# Patient Record
Sex: FEMALE | Race: WHITE | NOT HISPANIC OR LATINO | Employment: FULL TIME | URBAN - METROPOLITAN AREA
[De-identification: names, ages, dates, MRNs, and addresses within clinical notes are randomized per-mention and may not be internally consistent; named-entity substitution may affect disease eponyms.]

---

## 2014-05-20 LAB
EXTERNAL HIV SCREEN: NORMAL
HCV AB SER-ACNC: NEGATIVE

## 2023-01-16 ENCOUNTER — APPOINTMENT (OUTPATIENT)
Dept: LAB | Facility: CLINIC | Age: 38
End: 2023-01-16

## 2023-01-16 ENCOUNTER — APPOINTMENT (OUTPATIENT)
Dept: URGENT CARE | Facility: CLINIC | Age: 38
End: 2023-01-16

## 2023-01-16 DIAGNOSIS — Z02.1 ENCOUNTER FOR PRE-EMPLOYMENT EXAMINATION: Primary | ICD-10-CM

## 2023-01-16 DIAGNOSIS — Z02.1 ENCOUNTER FOR PRE-EMPLOYMENT EXAMINATION: ICD-10-CM

## 2023-01-16 LAB
MEV IGG SER QL IA: NORMAL
MUV IGG SER QL IA: NORMAL
RUBV IGG SERPL IA-ACNC: 53.7 IU/ML
VZV IGG SER QL IA: NORMAL

## 2023-01-17 LAB
GAMMA INTERFERON BACKGROUND BLD IA-ACNC: 0.06 IU/ML
M TB IFN-G BLD-IMP: NEGATIVE
M TB IFN-G CD4+ BCKGRND COR BLD-ACNC: 0 IU/ML
M TB IFN-G CD4+ BCKGRND COR BLD-ACNC: 0.01 IU/ML
MITOGEN IGNF BCKGRD COR BLD-ACNC: >10 IU/ML

## 2023-05-01 ENCOUNTER — APPOINTMENT (OUTPATIENT)
Dept: LAB | Facility: CLINIC | Age: 38
End: 2023-05-01

## 2023-05-01 ENCOUNTER — OFFICE VISIT (OUTPATIENT)
Dept: ENDOCRINOLOGY | Facility: CLINIC | Age: 38
End: 2023-05-01

## 2023-05-01 VITALS
WEIGHT: 192 LBS | SYSTOLIC BLOOD PRESSURE: 122 MMHG | OXYGEN SATURATION: 98 % | BODY MASS INDEX: 30.86 KG/M2 | DIASTOLIC BLOOD PRESSURE: 74 MMHG | HEART RATE: 91 BPM | HEIGHT: 66 IN

## 2023-05-01 DIAGNOSIS — E06.3 HYPOTHYROIDISM DUE TO HASHIMOTO'S THYROIDITIS: ICD-10-CM

## 2023-05-01 DIAGNOSIS — E03.8 HYPOTHYROIDISM DUE TO HASHIMOTO'S THYROIDITIS: Primary | ICD-10-CM

## 2023-05-01 DIAGNOSIS — E06.3 HYPOTHYROIDISM DUE TO HASHIMOTO'S THYROIDITIS: Primary | ICD-10-CM

## 2023-05-01 DIAGNOSIS — E03.8 HYPOTHYROIDISM DUE TO HASHIMOTO'S THYROIDITIS: ICD-10-CM

## 2023-05-01 LAB
T4 FREE SERPL-MCNC: 1.09 NG/DL (ref 0.76–1.46)
TSH SERPL DL<=0.05 MIU/L-ACNC: 0.38 UIU/ML (ref 0.45–4.5)

## 2023-05-01 RX ORDER — LEVOTHYROXINE SODIUM 0.1 MG/1
100 TABLET ORAL
COMMUNITY
Start: 2023-04-28 | End: 2023-05-05 | Stop reason: SDUPTHER

## 2023-05-01 NOTE — PROGRESS NOTES
Moy Curiel is a 43 y o  female who presented for hypothyroidism  She was diagnosed with hypothyroidism due to Hashimoto's thyroiditis many years ago, was following with [facility] endocrinology and she presented to establish care with us  She is currently on levothyroxine 100 mcg once daily and 200 mcg on Sundays  Associated signs/symptoms:     No  weight loss/No weight gain  No  Change in appetite  No heat intolerance/No  cold intolerance  No  diarrhea/No constipation  No sweating/No  Tremor/ No palpitation  No  difficulty sleeping  No hair loss/ No dry skin/ No  brittle nails  No fatigue  Mood changes: No     Changes in menstrual cycles: Regular     Swelling in the neck: No   Signs/symptoms due to thyroid gland enlargement:  Obstructive symptoms: No trouble swallowing,hoarseness or with voice , trouble breathing     Modifying factors: Risk factors for thyroid disease:  Lithium No   Interferon  No   Amiodarone No  Iodine exposure {No      Family history of thyroid disease: No        Past medical/surgical history  Past Medical History:   Diagnosis Date    Hypothyroid     Migraine            Past Surgical History:   Procedure Laterality Date     SECTION  2018     SECTION  2020    TONSILLECTOMY  2007        Family History:  Family History   Problem Relation Age of Onset    Hypertension Mother     Hyperlipidemia Mother     Diabetes type II Maternal Grandfather     Hypertension Maternal Grandfather     Hyperlipidemia Maternal Grandfather     Hypertension Maternal Grandmother     Hyperlipidemia Maternal Grandmother     Hypertension Paternal Grandmother         Social History:  Social History     Substance and Sexual Activity   Alcohol Use Not Currently     Social History     Tobacco Use   Smoking Status Never   Smokeless Tobacco Never         Review of Systems   Constitutional: Negative for appetite change, fatigue and unexpected weight change     HENT: Negative for trouble swallowing and voice change  Eyes: Negative for visual disturbance  Respiratory: Negative for cough, shortness of breath and wheezing  Cardiovascular: Negative for palpitations and leg swelling  Gastrointestinal: Negative for abdominal pain, constipation, diarrhea, nausea and vomiting  Endocrine: Negative for cold intolerance, heat intolerance, polyphagia and polyuria  Musculoskeletal: Negative for arthralgias  Skin: Negative for color change, rash and wound  Neurological: Negative for dizziness, tremors, weakness, light-headedness, numbness and headaches  Psychiatric/Behavioral: Negative for agitation and sleep disturbance  The patient is not nervous/anxious  Physical Examination:  Vitals:    05/01/23 1303   BP: 122/74   Pulse: 91   SpO2: 98%       General appearance - alert, well appearing, and in no distress  Mental status - normal mood, behavior, speech, dress, motor activity, and thought processes  Head/Eyes:  NC/AT EOMI; no staring gaze/ lid lag  mucous membranes moist, pharynx normal without lesions  Neck - supple, no significant adenopathy  Chest - clear to auscultation, no wheezes, rales or rhonchi, symmetric air entry  Heart - normal rate, regular rhythm, normal S1, S2, no murmurs  Thyroid -normal size, not tender, no nodule noted  Labs: Thyroid Stimulating Hormone 0 36 - 3 74 uIU/mL 1 22        Thyroid Stimulating Hormone 0 36 - 3 74 uIU/mL 0 43       Imaging:    Thyroid ultrasound     Clinical History: Thyromegaly, hypothyroidism     Multiple transverse and longitudinal images of the thyroid gland performed   using a 12 MHz linear transducer  The right lobe measures approximately 4 9 x   1 7 x 1 8 cm, the left lobe measures approximately 4 5 x 1 3 x 2 1 cm  The   thyroid gland is diffusely heterogeneous in echotexture and somewhat lobular in   contour  No measurable nodules are seen  The thyroid gland bilaterally is   mildly hyperemic    Procedure Note    Hurtis Peppers, "Jose Leonardo MD - 09/15/2016   Formatting of this note might be different from the original    Thyroid ultrasound     Clinical History: Thyromegaly, hypothyroidism     Multiple transverse and longitudinal images of the thyroid gland performed   using a 12 MHz linear transducer  The right lobe measures approximately 4 9 x   1 7 x 1 8 cm, the left lobe measures approximately 4 5 x 1 3 x 2 1 cm  The   thyroid gland is diffusely heterogeneous in echotexture and somewhat lobular in   contour  No measurable nodules are seen  The thyroid gland bilaterally is   mildly hyperemic  IMPRESSION:   Impression:   1  Mildly enlarged, diffusely heterogeneous thyroid gland without measurable   nodule  The thyroid gland bilaterally is not hyperemic  Exam End: 09/14/16  3:03 PM         Previous records including pertinent laboratory and radiographic results were reviewed and are summarized in the HPI and plan below  ASSESSMENT/PLAN:              1  Hypothyroidism due to Hashimoto's thyroiditis  Currently on levothyroxine 100 mcg once daily 6 days a week and 200 mcg on Sundays  She is taking it regularly and properly, she is clinically euthyroid, no recent TFT  I ordered TSH and free T4 and will adjust levothyroxine accordingly     Discussed the pathophysiology, manifestations, differnential diagnosis and management of hypothyroidism  Reviewed the hypothalamic-ptiuitary-thyroid axis and interpretation and significance of TSH, FT4, FT3 values  Advised that levotyroxine should be taken on an empty stomach  Should avoid taking medications like iron, calcium, tums, [id number], PPI at the same time that may decrease absorption of T4  Should separate administration by 4hrs  Portions of the record may have been created with voice recognition software  Occasional wrong word or \"sound a like\" substitutions may have occurred due to the inherent limitations of voice recognition software    Read the chart carefully and " recognize, using context, where substitutions have occurred

## 2023-05-02 DIAGNOSIS — E06.3 HYPOTHYROIDISM DUE TO HASHIMOTO'S THYROIDITIS: Primary | ICD-10-CM

## 2023-05-02 DIAGNOSIS — E03.8 HYPOTHYROIDISM DUE TO HASHIMOTO'S THYROIDITIS: Primary | ICD-10-CM

## 2023-05-04 ENCOUNTER — TELEPHONE (OUTPATIENT)
Dept: ENDOCRINOLOGY | Facility: CLINIC | Age: 38
End: 2023-05-04

## 2023-05-04 DIAGNOSIS — E03.8 HYPOTHYROIDISM DUE TO HASHIMOTO'S THYROIDITIS: Primary | ICD-10-CM

## 2023-05-04 DIAGNOSIS — E06.3 HYPOTHYROIDISM DUE TO HASHIMOTO'S THYROIDITIS: Primary | ICD-10-CM

## 2023-05-04 NOTE — TELEPHONE ENCOUNTER
Pt left msg on voicemail requesting script of Levothyroxine be sent to [facility]

## 2023-05-05 RX ORDER — LEVOTHYROXINE SODIUM 0.1 MG/1
TABLET ORAL
Qty: 102 TABLET | Refills: 1 | Status: SHIPPED | OUTPATIENT
Start: 2023-05-05

## 2023-06-02 ENCOUNTER — TELEPHONE (OUTPATIENT)
Dept: ADMINISTRATIVE | Facility: OTHER | Age: 38
End: 2023-06-02

## 2023-06-02 NOTE — TELEPHONE ENCOUNTER
Upon review of the In Basket request we were able to locate, review, and update the patient chart as requested for Hepatitis C , HIV and Pap Smear (HPV) aka Cervical Cancer Screening  Any additional questions or concerns should be emailed to the Practice Liaisons via the appropriate education email address, please do not reply via In Basket      Thank you  Andrea Valenzuela

## 2023-06-02 NOTE — TELEPHONE ENCOUNTER
----- Message from Aristeo Ernandez sent at 6/1/2023 12:53 PM EDT -----  Regarding: PAP, Hep C, HIV  06/01/23 12:53 PM    Hello, our patient Vignesh Hodge has had Pap Smear (HPV) aka Cervical Cancer Screening completed/performed  Please assist in updating the patient chart by pulling the Care Everywhere (CE) document  The date of service is 03/08/2022  Thank you,  Aristeo [facility] AT [id number]    06/01/23 12:54 PM    Hello, our patient Vignesh Hodge has had Hepatitis C completed/performed  Please assist in updating the patient chart by pulling the Care Everywhere (CE) document  The date of service is 05/20/2014  Thank you,  Aristeo [facility] AT [id number]    06/01/23 12:54 PM    Hello, our patient Vignesh Hodge has had HIV completed/performed  Please assist in updating the patient chart by pulling the Care Everywhere (CE) document  The date of service is 05/20/2014       Thank you,  Aristeo [facility]

## 2023-06-26 ENCOUNTER — OFFICE VISIT (OUTPATIENT)
Dept: FAMILY MEDICINE CLINIC | Facility: CLINIC | Age: 38
End: 2023-06-26
Payer: COMMERCIAL

## 2023-06-26 VITALS
BODY MASS INDEX: 30.37 KG/M2 | WEIGHT: 189 LBS | HEIGHT: 66 IN | TEMPERATURE: 96.9 F | SYSTOLIC BLOOD PRESSURE: 124 MMHG | OXYGEN SATURATION: 99 % | HEART RATE: 95 BPM | DIASTOLIC BLOOD PRESSURE: 88 MMHG

## 2023-06-26 DIAGNOSIS — Z13.220 LIPID SCREENING: ICD-10-CM

## 2023-06-26 DIAGNOSIS — Z00.00 ANNUAL PHYSICAL EXAM: ICD-10-CM

## 2023-06-26 DIAGNOSIS — Z76.89 ENCOUNTER TO ESTABLISH CARE WITH NEW DOCTOR: Primary | ICD-10-CM

## 2023-06-26 DIAGNOSIS — E55.9 VITAMIN D DEFICIENCY: ICD-10-CM

## 2023-06-26 DIAGNOSIS — Z82.49 FAMILY HISTORY OF AORTIC ANEURYSM: ICD-10-CM

## 2023-06-26 DIAGNOSIS — Z13.1 ENCOUNTER FOR SCREENING EXAMINATION FOR IMPAIRED GLUCOSE REGULATION AND DIABETES MELLITUS: ICD-10-CM

## 2023-06-26 PROBLEM — O14.90 PREECLAMPSIA: Status: RESOLVED | Noted: 2018-06-29 | Resolved: 2023-06-26

## 2023-06-26 PROBLEM — O45.90 PLACENTAL ABRUPTION: Status: RESOLVED | Noted: 2020-12-28 | Resolved: 2023-06-26

## 2023-06-26 PROBLEM — J30.9 ALLERGIC RHINITIS: Status: ACTIVE | Noted: 2023-06-26

## 2023-06-26 PROBLEM — G43.909 MIGRAINES: Status: ACTIVE | Noted: 2023-06-26

## 2023-06-26 PROBLEM — O45.90 PLACENTAL ABRUPTION: Status: ACTIVE | Noted: 2020-12-28

## 2023-06-26 PROBLEM — O14.90 PREECLAMPSIA: Status: ACTIVE | Noted: 2018-06-29

## 2023-06-26 PROCEDURE — 99385 PREV VISIT NEW AGE 18-39: CPT | Performed by: FAMILY MEDICINE

## 2023-06-26 NOTE — PROGRESS NOTES
ADULT ANNUAL [id number]    NAME: Nelsy Lopez  AGE: 43 y o  SEX: female  : 1985     DATE: 2023     Assessment and Plan:   Dayton Marcos was seen today for establish care  Diagnoses and all orders for this visit:    Encounter to establish care with new doctor    Annual physical exam    Lipid screening  -     Lipid panel; Future    Encounter for screening examination for impaired glucose regulation and diabetes mellitus  -     HEMOGLOBIN A1C W/ EAG ESTIMATION; Future    Family history of aortic aneurysm  Comments:  pt states her mother has unruptured thoracic aort aneurysm; she herself was checked during pregnancy  and echo clear    Vitamin D deficiency  -     Vitamin D 25 hydroxy; Future        Problem List Items Addressed This Visit        Other    Vitamin D deficiency    Relevant Orders    Vitamin D 25 hydroxy    Family history of aortic aneurysm   Other Visit Diagnoses     Encounter to establish care with new doctor    -  Primary    Annual physical exam        Lipid screening        Relevant Orders    Lipid panel    Encounter for screening examination for impaired glucose regulation and diabetes mellitus        Relevant Orders    HEMOGLOBIN A1C W/ EAG ESTIMATION      generally in very good health, pt's hypothyroidism managed by endo; cpm    Immunizations and preventive care screenings were discussed with patient today  Appropriate education was printed on patient's after visit summary  Counseling:  · Exercise: the importance of regular exercise/physical activity was discussed  Recommend exercise 3-5 times per week for at least 30 minutes  BMI Counseling: Body mass index is 30 51 kg/m²  The BMI is above normal  Nutrition recommendations include reducing portion sizes  Exercise recommendations include exercising 3-5 times per week           Return in about 1 year (around 2024) for Annual physical      Chief Complaint: "    Chief Complaint   Patient presents with   • Establish Care      History of Present Illness:     Adult Annual Physical   Patient here to establish care as new pt - last PCP was [facility], pt now works for [employer] since February this year  She is also due for a comprehensive annual physical exam, which we can do today  Chart review shows that pt already switched to endo for management of hypothyr due to hashimotos'  \"and I have  GYN appt in August\"    The patient reports no problems  Her Mat GM goes in week to see if she has breast cancer- they found something abnromal on her mammo\"     Diet and Physical Activity  · Diet/Nutrition: well balanced diet  · Exercise: walking  Depression Screening  PHQ-2/9 Depression Screening    Little interest or pleasure in doing things: 0 - not at all  Feeling down, depressed, or hopeless: 0 - not at all  PHQ-2 Score: 0  PHQ-2 Interpretation: Negative depression screen       General Health  · Sleep: 6-7 hours on average  · Hearing: normal - bilateral   · Vision: previous LASIK surgery  · Dental: regular dental visits         /GYN Health  · Has appt with [facility] - last was a little over a year ago     Review of Systems:     Review of Systems   Past Medical History:     Past Medical History:   Diagnosis Date   • Hypothyroid    • Migraine    • Placental abruption 2020   • Preeclampsia 2018      Past Surgical History:     Past Surgical History:   Procedure Laterality Date   •  SECTION     •  SECTION     • TONSILLECTOMY        Social History:     Social History     Socioeconomic History   • Marital status: Single     Spouse name: None   • Number of children: None   • Years of education: None   • Highest education level: None   Occupational History   • None   Tobacco Use   • Smoking status: Never   • Smokeless tobacco: Never   Substance and Sexual Activity   • Alcohol use: Not Currently   • Drug use: Never   • Sexual activity: Not " "Currently     Partners: Male   Other Topics Concern   • None   Social History Narrative    , 2 children    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[employer] [address] Strain: Not on [employer] Insecurity: Not on file   Transportation Needs: Not on file   Physical Activity: Not on file   Stress: Not on file   Social Connections: Not on file   Intimate Partner Violence: Not on file   Housing Stability: Not on file      Family History:     Family History   Problem Relation Age of Onset   • Hypertension Mother    • Hyperlipidemia Mother    • Diabetes type II Maternal Grandfather    • Hypertension Maternal Grandfather    • Hyperlipidemia Maternal Grandfather    • Hypertension Maternal Grandmother    • Hyperlipidemia Maternal Grandmother    • Hypertension Paternal Grandmother       Current Medications:     Current Outpatient Medications   Medication Sig Dispense Refill   • Aspirin-Acetaminophen-Caffeine (EXCEDRIN MIGRAINE PO) Take by mouth     • levothyroxine 100 mcg tablet 100 mcg 6 days a week and 200 mcg on Sunday  102 tablet 1     No current facility-administered medications for this visit  Allergies:     No Known Allergies   Physical Exam:     /88 (BP Location: Left arm, Patient Position: Sitting, Cuff Size: Standard)   Pulse 95   Temp (!) 96 9 °F (36 1 °C) (Tympanic)   Ht 5' 6\" (1 676 m)   Wt 85 7 kg (189 lb)   SpO2 99%   BMI 30 51 kg/m²     Physical Exam  Vitals and nursing note reviewed  Constitutional:       General: She is not in acute distress  Appearance: She is well-developed and well-groomed  She is not ill-appearing, toxic-appearing or diaphoretic  HENT:      Head: Normocephalic and atraumatic  Right Ear: Tympanic membrane, ear canal and external ear normal       Left Ear: Tympanic membrane, ear canal and external ear normal       Nose: Nose normal       Mouth/Throat:      Lips: Pink        Mouth: Mucous membranes are moist       " Pharynx: Oropharynx is clear  Uvula midline  Tonsils: 0 on the right  0 on the left  Eyes:      General: Lids are normal       Extraocular Movements: Extraocular movements intact  Conjunctiva/sclera: Conjunctivae normal       Pupils: Pupils are equal, round, and reactive to light  Neck:      Thyroid: No thyroid mass, thyromegaly or thyroid tenderness  Vascular: No JVD  Trachea: Trachea and phonation normal    Cardiovascular:      Rate and Rhythm: Normal rate and regular rhythm  Pulses: Normal pulses  Heart sounds: Normal heart sounds  Pulmonary:      Effort: Pulmonary effort is normal       Breath sounds: Normal breath sounds and air entry  Abdominal:      General: Bowel sounds are normal  There is no distension or abdominal bruit  Palpations: Abdomen is soft  There is no hepatomegaly, splenomegaly or mass  Tenderness: There is no abdominal tenderness  Hernia: There is no hernia in the ventral area  Musculoskeletal:      Cervical back: Neck supple  Right lower leg: No edema  Left lower leg: No edema  Lymphadenopathy:      Cervical: No cervical adenopathy  Skin:     General: Skin is warm and dry  Capillary Refill: Capillary refill takes less than 2 seconds  Coloration: Skin is not pale  Neurological:      Mental Status: She is alert and oriented to person, place, and time  Cranial Nerves: Cranial nerves 2-12 are intact  Sensory: Sensation is intact  Motor: Motor function is intact  Coordination: Coordination is intact  Gait: Gait normal       Deep Tendon Reflexes: Reflexes are normal and symmetric  Psychiatric:         Mood and Affect: Mood normal          Behavior: Behavior normal  Behavior is cooperative            Carly Brown [phone]

## 2023-06-30 ENCOUNTER — APPOINTMENT (OUTPATIENT)
Dept: LAB | Facility: HOSPITAL | Age: 38
End: 2023-06-30
Attending: STUDENT IN AN ORGANIZED HEALTH CARE EDUCATION/TRAINING PROGRAM
Payer: COMMERCIAL

## 2023-06-30 DIAGNOSIS — E55.9 VITAMIN D DEFICIENCY: ICD-10-CM

## 2023-06-30 DIAGNOSIS — Z13.1 ENCOUNTER FOR SCREENING EXAMINATION FOR IMPAIRED GLUCOSE REGULATION AND DIABETES MELLITUS: ICD-10-CM

## 2023-06-30 DIAGNOSIS — E06.3 HYPOTHYROIDISM DUE TO HASHIMOTO'S THYROIDITIS: Primary | ICD-10-CM

## 2023-06-30 DIAGNOSIS — E03.9 ACQUIRED HYPOTHYROIDISM: ICD-10-CM

## 2023-06-30 DIAGNOSIS — E03.8 HYPOTHYROIDISM DUE TO HASHIMOTO'S THYROIDITIS: Primary | ICD-10-CM

## 2023-06-30 DIAGNOSIS — E06.3 HYPOTHYROIDISM DUE TO HASHIMOTO'S THYROIDITIS: ICD-10-CM

## 2023-06-30 DIAGNOSIS — Z13.220 LIPID SCREENING: ICD-10-CM

## 2023-06-30 DIAGNOSIS — E03.8 HYPOTHYROIDISM DUE TO HASHIMOTO'S THYROIDITIS: ICD-10-CM

## 2023-06-30 LAB
25(OH)D3 SERPL-MCNC: 27.7 NG/ML (ref 30–100)
CHOLEST SERPL-MCNC: 217 MG/DL
EST. AVERAGE GLUCOSE BLD GHB EST-MCNC: 111 MG/DL
HBA1C MFR BLD: 5.5 %
HDLC SERPL-MCNC: 71 MG/DL
LDLC SERPL CALC-MCNC: 128 MG/DL (ref 0–100)
NONHDLC SERPL-MCNC: 146 MG/DL
T4 FREE SERPL-MCNC: 0.64 NG/DL (ref 0.61–1.12)
TRIGL SERPL-MCNC: 89 MG/DL
TSH SERPL DL<=0.05 MIU/L-ACNC: 7.26 UIU/ML (ref 0.45–4.5)

## 2023-06-30 PROCEDURE — 82306 VITAMIN D 25 HYDROXY: CPT

## 2023-06-30 PROCEDURE — 80061 LIPID PANEL: CPT

## 2023-06-30 PROCEDURE — 83036 HEMOGLOBIN GLYCOSYLATED A1C: CPT

## 2023-06-30 PROCEDURE — 36415 COLL VENOUS BLD VENIPUNCTURE: CPT

## 2023-06-30 PROCEDURE — 84439 ASSAY OF FREE THYROXINE: CPT

## 2023-06-30 PROCEDURE — 84443 ASSAY THYROID STIM HORMONE: CPT

## 2023-06-30 RX ORDER — LEVOTHYROXINE SODIUM 112 UG/1
112 TABLET ORAL DAILY
Qty: 90 TABLET | Refills: 0 | Status: SHIPPED | OUTPATIENT
Start: 2023-06-30 | End: 2023-09-28

## 2023-07-09 ENCOUNTER — OFFICE VISIT (OUTPATIENT)
Dept: URGENT CARE | Facility: CLINIC | Age: 38
End: 2023-07-09
Payer: COMMERCIAL

## 2023-07-09 VITALS
BODY MASS INDEX: 30.6 KG/M2 | RESPIRATION RATE: 16 BRPM | WEIGHT: 190.4 LBS | TEMPERATURE: 98.4 F | HEART RATE: 94 BPM | OXYGEN SATURATION: 98 % | HEIGHT: 66 IN | DIASTOLIC BLOOD PRESSURE: 76 MMHG | SYSTOLIC BLOOD PRESSURE: 123 MMHG

## 2023-07-09 DIAGNOSIS — L03.90 CELLULITIS, UNSPECIFIED CELLULITIS SITE: Primary | ICD-10-CM

## 2023-07-09 PROCEDURE — 99213 OFFICE O/P EST LOW 20 MIN: CPT | Performed by: PHYSICIAN ASSISTANT

## 2023-07-09 RX ORDER — CEPHALEXIN 500 MG/1
500 CAPSULE ORAL EVERY 8 HOURS SCHEDULED
Qty: 21 CAPSULE | Refills: 0 | Status: SHIPPED | OUTPATIENT
Start: 2023-07-09 | End: 2023-07-16

## 2023-07-09 NOTE — PROGRESS NOTES
[facility]        NAME: Carolina Read is a 43 y.o. female  : 1985    MRN: [id number]  DATE: 2023  TIME: 2:16 PM    Assessment and Plan   Cellulitis, unspecified cellulitis site [L03.90]  1. Cellulitis, unspecified cellulitis site  cephalexin (KEFLEX) 500 mg capsule        Patient Instructions     Take antibiotic as prescribed  Warm compresses  Follow up with PCP in 3-5 days if sxs worsen or persist  Proceed to  ER if symptoms worsen. Chief Complaint     Chief Complaint   Patient presents with   • Rash     Rash to her left hip area started yesterday          History of Present Illness       Rash  This is a new problem. The current episode started yesterday. The problem has been rapidly worsening since onset. Location: left hip. The problem is moderate. The rash is characterized by redness and pain. It is unknown if there was an exposure to a precipitant. Associated symptoms include fatigue. Pertinent negatives include no fever, shortness of breath or vomiting. Past treatments include topical steroids (warm compress). Denies knowledge of tick bite    Review of Systems   Review of Systems   Constitutional: Positive for fatigue. Negative for chills, diaphoresis and fever. Respiratory: Negative for shortness of breath. Gastrointestinal: Negative for vomiting. Skin: Positive for rash. Negative for wound.          Current Medications       Current Outpatient Medications:   •  Aspirin-Acetaminophen-Caffeine (EXCEDRIN MIGRAINE PO), Take by mouth, Disp: , Rfl:   •  cephalexin (KEFLEX) 500 mg capsule, Take 1 capsule (500 mg total) by mouth every 8 (eight) hours for 7 days, Disp: 21 capsule, Rfl: 0  •  levothyroxine 112 mcg tablet, Take 1 tablet (112 mcg total) by mouth daily, Disp: 90 tablet, Rfl: 0    Current Allergies     Allergies as of 2023   • (No Known Allergies)            The following portions of the patient's history were reviewed and updated as appropriate: allergies, current medications, past family history, past medical history, past social history, past surgical history and problem list.     Past Medical History:   Diagnosis Date   • Hypothyroid    • Migraine    • Placental abruption 2020   • Preeclampsia 2018       Past Surgical History:   Procedure Laterality Date   •  SECTION     •  SECTION     • TONSILLECTOMY         Family History   Problem Relation Age of Onset   • Hypertension Mother    • Hyperlipidemia Mother    • Diabetes type II Maternal Grandfather    • Hypertension Maternal Grandfather    • Hyperlipidemia Maternal Grandfather    • Hypertension Maternal Grandmother    • Hyperlipidemia Maternal Grandmother    • Hypertension Paternal Grandmother          Medications have been verified. Objective   /76   Pulse 94   Temp 98.4 °F (36.9 °C)   Resp 16   Ht 5' 6" (1.676 m)   Wt 86.4 kg (190 lb 6.4 oz)   SpO2 98%   BMI 30.73 kg/m²   No LMP recorded. Physical Exam     Physical Exam  Constitutional:       Appearance: Normal appearance. Cardiovascular:      Rate and Rhythm: Normal rate and regular rhythm. Pulmonary:      Effort: Pulmonary effort is normal. No respiratory distress. Abdominal:      General: Abdomen is flat. There is no distension. Palpations: Abdomen is soft. Skin:         Neurological:      Mental Status: She is alert.

## 2023-08-11 ENCOUNTER — OFFICE VISIT (OUTPATIENT)
Dept: OBGYN CLINIC | Facility: MEDICAL CENTER | Age: 38
End: 2023-08-11
Payer: COMMERCIAL

## 2023-08-11 VITALS
HEIGHT: 66 IN | WEIGHT: 184 LBS | BODY MASS INDEX: 29.57 KG/M2 | SYSTOLIC BLOOD PRESSURE: 110 MMHG | DIASTOLIC BLOOD PRESSURE: 80 MMHG

## 2023-08-11 DIAGNOSIS — Z01.419 ENCOUNTER FOR WELL WOMAN EXAM WITH ROUTINE GYNECOLOGICAL EXAM: Primary | ICD-10-CM

## 2023-08-11 PROCEDURE — S0610 ANNUAL GYNECOLOGICAL EXAMINA: HCPCS | Performed by: OBSTETRICS & GYNECOLOGY

## 2023-08-11 NOTE — PROGRESS NOTES
[facility] [address] [place] [address]    ASSESSMENT/PLAN: Karey Dumont is a 43 y.o. [id number] who presents for annual gynecologic exam.    Encounter for routine gynecologic examination  - Routine well woman exam completed today. - Cervical Cancer Screening: Current ASCCP Guidelines reviewed. Last Pap: 03/08/2022. Discussed every 2-3 years  - HPV Vaccination status: Immunization series complete  - Contraceptive counseling discussed. Current contraception: none     Additional problems addressed during this visit:  1. Encounter for well woman exam with routine gynecological exam        CC:  Annual Gynecologic Examination    HPI: Karey Dumont is a 43 y.o. [id number] who presents for annual gynecologic examination. HPI  She reports no new changes to her health. She reports no breast concerns. She gets regular periods. She has no vaginal discharge, vulvar or vaginal lesions, pelvic pain, or abnormal bleeding. She has no sexual health concerns and is currently sexually active with one male partner. She contracepts with nothing. Discussed prior obstetric history of pre-eclampsia and abruption. Discussed testing for APS  Also discussed preconceptual counseling with M as well    The following portions of the patient's history were reviewed and updated as appropriate: allergies, current medications, past family history, past medical history, obstetric history, gynecologic history, past social history, past surgical history and problem list.    Review of Systems   Constitutional: Negative. HENT: Negative. Eyes: Negative. Respiratory: Negative. Cardiovascular: Negative. Gastrointestinal: Negative. Genitourinary: Negative. Musculoskeletal: Negative. All other systems reviewed and are negative.         Objective:  /80 (BP Location: Left arm)   Ht 5' 6" (1.676 m)   Wt 83.5 kg (184 lb)   LMP 08/08/2023   BMI 29.70 kg/m²    Physical Exam  Vitals reviewed. Constitutional:       General: She is not in acute distress. Appearance: She is well-developed. HENT:      Head: Normocephalic and atraumatic. Nose: Nose normal.   Cardiovascular:      Rate and Rhythm: Normal rate. Pulmonary:      Effort: Pulmonary effort is normal. No respiratory distress. Chest:   Breasts:     Breasts are symmetrical.      Right: Normal. No mass, nipple discharge, skin change or tenderness. Left: Normal. No mass, nipple discharge, skin change or tenderness. Abdominal:      General: There is no distension. Palpations: Abdomen is soft. There is no mass. Tenderness: There is no abdominal tenderness. There is no guarding or rebound. Genitourinary:     General: Normal vulva. Exam position: Lithotomy position. Labia:         Right: No lesion. Left: No lesion. Urethra: No prolapse (urethral meatus normal). Vagina: Normal. No vaginal discharge, erythema or bleeding. Cervix: Normal.      Uterus: Normal.       Adnexa: Right adnexa normal and left adnexa normal.   Musculoskeletal:         General: Normal range of motion. Cervical back: Normal range of motion. Lymphadenopathy:      Upper Body:      Right upper body: No supraclavicular, axillary or pectoral adenopathy. Left upper body: No supraclavicular, axillary or pectoral adenopathy. Lower Body: No right inguinal adenopathy. No left inguinal adenopathy. Skin:     General: Skin is warm and dry. Neurological:      Mental Status: She is alert and oriented to person, place, and time. Psychiatric:         Behavior: Behavior normal.         Thought Content:  Thought content normal.         Judgment: Judgment normal.

## 2023-09-07 ENCOUNTER — APPOINTMENT (OUTPATIENT)
Dept: LAB | Facility: HOSPITAL | Age: 38
End: 2023-09-07
Attending: STUDENT IN AN ORGANIZED HEALTH CARE EDUCATION/TRAINING PROGRAM
Payer: COMMERCIAL

## 2023-09-07 DIAGNOSIS — E06.3 HYPOTHYROIDISM DUE TO HASHIMOTO'S THYROIDITIS: ICD-10-CM

## 2023-09-07 DIAGNOSIS — E03.8 HYPOTHYROIDISM DUE TO HASHIMOTO'S THYROIDITIS: ICD-10-CM

## 2023-09-07 LAB
T4 FREE SERPL-MCNC: 0.93 NG/DL (ref 0.61–1.12)
TSH SERPL DL<=0.05 MIU/L-ACNC: 1.08 UIU/ML (ref 0.45–4.5)

## 2023-09-07 PROCEDURE — 36415 COLL VENOUS BLD VENIPUNCTURE: CPT

## 2023-09-07 PROCEDURE — 84439 ASSAY OF FREE THYROXINE: CPT

## 2023-09-07 PROCEDURE — 84443 ASSAY THYROID STIM HORMONE: CPT

## 2023-09-08 DIAGNOSIS — E06.3 HYPOTHYROIDISM DUE TO HASHIMOTO'S THYROIDITIS: ICD-10-CM

## 2023-09-08 DIAGNOSIS — E03.8 HYPOTHYROIDISM DUE TO HASHIMOTO'S THYROIDITIS: ICD-10-CM

## 2023-09-08 RX ORDER — LEVOTHYROXINE SODIUM 112 UG/1
112 TABLET ORAL DAILY
Qty: 90 TABLET | Refills: 0 | Status: SHIPPED | OUTPATIENT
Start: 2023-09-08 | End: 2023-12-07

## 2023-10-10 DIAGNOSIS — E03.8 HYPOTHYROIDISM DUE TO HASHIMOTO'S THYROIDITIS: ICD-10-CM

## 2023-10-10 DIAGNOSIS — E06.3 HYPOTHYROIDISM DUE TO HASHIMOTO'S THYROIDITIS: ICD-10-CM

## 2023-10-10 RX ORDER — LEVOTHYROXINE SODIUM 112 UG/1
112 TABLET ORAL DAILY
Qty: 90 TABLET | Refills: 0 | Status: SHIPPED | OUTPATIENT
Start: 2023-10-10 | End: 2024-01-08

## 2023-11-16 ENCOUNTER — OFFICE VISIT (OUTPATIENT)
Dept: ENDOCRINOLOGY | Facility: CLINIC | Age: 38
End: 2023-11-16
Payer: COMMERCIAL

## 2023-11-16 VITALS
TEMPERATURE: 97.5 F | DIASTOLIC BLOOD PRESSURE: 68 MMHG | BODY MASS INDEX: 30.79 KG/M2 | WEIGHT: 191.6 LBS | SYSTOLIC BLOOD PRESSURE: 112 MMHG | HEIGHT: 66 IN | OXYGEN SATURATION: 98 % | HEART RATE: 92 BPM

## 2023-11-16 DIAGNOSIS — E03.8 HYPOTHYROIDISM DUE TO HASHIMOTO'S THYROIDITIS: Primary | ICD-10-CM

## 2023-11-16 DIAGNOSIS — E06.3 HYPOTHYROIDISM DUE TO HASHIMOTO'S THYROIDITIS: Primary | ICD-10-CM

## 2023-11-16 PROCEDURE — 99213 OFFICE O/P EST LOW 20 MIN: CPT | Performed by: STUDENT IN AN ORGANIZED HEALTH CARE EDUCATION/TRAINING PROGRAM

## 2023-11-16 NOTE — PROGRESS NOTES
Leela Montenegro 43 y.o. female MRN: [id number]    Encounter: [id number]      Assessment/Plan     1. Hypothyroidism due to Hashimoto's thyroiditis  Eli Bustos is clinically and biochemically euthyroid, she is on levothyroxine 112 mcg once a day, she is taking it regularly and properly. We will continue current regimen. Check TSH and free T4 now. Return back in 6 months. - T4, free Lab Collect; Future  - TSH, 3rd generation Lab Collect; Future    CC:   Hypothyroidism     History of Present Illness     HPI:  Leela Montenegro is a 43year old female with history of hypothyroidism due to Hashimoto's thyroiditis who presents for follow-up. She was last seen in May 2020, currently on levothyroxine 112 mcg daily. Component      Latest Ref Rng 9/7/2023   Free T4      0.61 - 1.12 ng/dL 0.93    TSH 3RD GENERATON      0.450 - 4.500 uIU/mL 1.076      She reports hair loss,     Eli Bustos denies palpitations, tremors, heat intolerance, bowel movement changes. Review of Systems   Constitutional:  Negative for appetite change, fatigue and unexpected weight change. HENT:  Negative for trouble swallowing and voice change. Eyes:  Negative for visual disturbance. Respiratory:  Negative for cough, shortness of breath and wheezing. Cardiovascular:  Negative for palpitations and leg swelling. Gastrointestinal:  Negative for abdominal pain, constipation, diarrhea, nausea and vomiting. Endocrine: Negative for cold intolerance, heat intolerance, polyphagia and polyuria. Musculoskeletal:  Negative for arthralgias. Skin:  Negative for color change, rash and wound. Neurological:  Negative for dizziness, tremors, weakness, light-headedness, numbness and headaches. Psychiatric/Behavioral:  Negative for agitation and sleep disturbance. The patient is not nervous/anxious.         Historical Information   Past Medical History:   Diagnosis Date    Abnormal Pap smear of cervix     Hypothyroid     Migraine     Placental abruption 2020    Preeclampsia 2018     Past Surgical History:   Procedure Laterality Date     SECTION       SECTION      TONSILLECTOMY  2007     Social History   Social History     Substance and Sexual Activity   Alcohol Use Not Currently     Social History     Substance and Sexual Activity   Drug Use Never     Social History     Tobacco Use   Smoking Status Never   Smokeless Tobacco Never     Family History:   Family History   Problem Relation Age of Onset    Hypertension Mother     Hyperlipidemia Mother     Asthma Mother     Migraines Mother     Thyroid disease Mother     Diabetes type II Maternal Grandfather     Hypertension Maternal Grandfather     Hyperlipidemia Maternal Grandfather     Diabetes Maternal Grandfather     Heart attack Maternal Grandfather     Heart disease Maternal Grandfather     Heart failure Maternal Grandfather     Hypertension Maternal Grandmother     Hyperlipidemia Maternal Grandmother     Heart disease Maternal Grandmother     Migraines Maternal Grandmother     Hypertension Paternal Grandmother        Meds/Allergies   Current Outpatient Medications   Medication Sig Dispense Refill    Aspirin-Acetaminophen-Caffeine (EXCEDRIN MIGRAINE PO) Take by mouth      levothyroxine 112 mcg tablet Take 1 tablet (112 mcg total) by mouth daily 90 tablet 0     No current facility-administered medications for this visit. No Known Allergies    Objective   Vitals: There were no vitals taken for this visit. Physical Exam  Constitutional:       Appearance: She is well-developed. HENT:      Head: Normocephalic and atraumatic. Nose: Nose normal.   Eyes:      Pupils: Pupils are equal, round, and reactive to light. Neck:      Thyroid: No thyromegaly. Vascular: No JVD. Cardiovascular:      Rate and Rhythm: Normal rate and regular rhythm. Heart sounds: Normal heart sounds. No murmur heard. No friction rub. No gallop.    Pulmonary:      Effort: Pulmonary effort is normal. No respiratory distress. Breath sounds: Normal breath sounds. No stridor. No wheezing or rales. Chest:      Chest wall: No tenderness. Abdominal:      General: Bowel sounds are normal. There is no distension. Palpations: Abdomen is soft. There is no mass. Tenderness: There is no abdominal tenderness. There is no guarding. Musculoskeletal:         General: No deformity. Normal range of motion. Cervical back: Normal range of motion. Skin:     General: Skin is warm. Neurological:      Mental Status: She is alert and oriented to person, place, and time. The history was obtained from the review of the chart, patient. Lab Results:   Lab Results   Component Value Date/Time    TSH 3RD GENERATON 1.076 09/07/2023 08:22 AM    TSH 3RD GENERATON 7.256 (H) 06/30/2023 06:48 AM    TSH 3RD GENERATON 0.377 (L) 05/01/2023 01:33 PM    Free T4 0.93 09/07/2023 08:22 AM    Free T4 0.64 06/30/2023 06:48 AM    Free T4 1.09 05/01/2023 01:33 PM       Imaging Studies:       I have personally reviewed pertinent reports. Portions of the record may have been created with voice recognition software. Occasional wrong word or "sound a like" substitutions may have occurred due to the inherent limitations of voice recognition software. Read the chart carefully and recognize, using context, where substitutions have occurred.

## 2023-11-21 ENCOUNTER — AMB VIDEO VISIT (OUTPATIENT)
Dept: OTHER | Facility: HOSPITAL | Age: 38
End: 2023-11-21
Payer: COMMERCIAL

## 2023-11-21 VITALS — HEART RATE: 93 BPM | RESPIRATION RATE: 16 BRPM | TEMPERATURE: 98.7 F

## 2023-11-21 DIAGNOSIS — J20.9 ACUTE BRONCHITIS, UNSPECIFIED ORGANISM: Primary | ICD-10-CM

## 2023-11-21 PROBLEM — E03.4 HYPOTHYROIDISM DUE TO ACQUIRED ATROPHY OF THYROID: Chronic | Status: ACTIVE | Noted: 2021-08-19

## 2023-11-21 PROCEDURE — 99212 OFFICE O/P EST SF 10 MIN: CPT | Performed by: PHYSICIAN ASSISTANT

## 2023-11-21 RX ORDER — ALBUTEROL SULFATE 90 UG/1
2 AEROSOL, METERED RESPIRATORY (INHALATION) EVERY 6 HOURS PRN
Qty: 6.7 G | Refills: 0 | Status: SHIPPED | OUTPATIENT
Start: 2023-11-21

## 2023-11-21 RX ORDER — METHYLPREDNISOLONE 4 MG/1
TABLET ORAL
Qty: 21 TABLET | Refills: 0 | Status: SHIPPED | OUTPATIENT
Start: 2023-11-21

## 2023-11-21 NOTE — PATIENT INSTRUCTIONS
Schedule a follow-up appointment with your primary care physician for recheck in 2-3 days. If you cannot see your PCP, you can schedule a follow up appointment at a [facility]. Go to the emergency department if you develop any new or worsening symptoms including shortness of breath, chest pain, or anything else that is concerning. Excuses can be found in "Letters" section of [employer]. Can print if opened from a [address] phone number is [phone] if you need assistance or have further questions    1 21 ) [place] ([phone])  Schedule or Reschedule Outpatient Testing - Option 2  Billing - Option 3  General Info - Option 4  [employer] Help - Option 5  Comprehensive Spine Program - Option 6   COVID - Option 7    Acute Bronchitis   WHAT YOU NEED TO KNOW:   Acute bronchitis is swelling and irritation in your lungs. It is usually caused by a virus and most often happens in the winter. Bronchitis may also be caused by bacteria or by a chemical irritant, such as smoke. DISCHARGE INSTRUCTIONS:   Return to the emergency department if:   You cough up blood. Your lips or fingernails turn blue. You feel like you are not getting enough air when you breathe. Call your doctor if:   Your symptoms do not go away or get worse, even after treatment. Your cough does not get better within 4 weeks. You have questions or concerns about your condition or care. Medicines: You may need any of the following:  Cough suppressants  decrease your urge to cough. Decongestants  help loosen mucus in your lungs and make it easier to cough up. This can help you breathe easier. Inhalers  may be given. Your healthcare provider may give you one or more inhalers to help you breathe easier and cough less. An inhaler gives you medicine to open your airways. Ask your healthcare provider to show you how to use your inhaler correctly.          Antiviral medicine  treats infections caused by a virus.    Antibiotics  may be given if your bronchitis is caused by bacteria or if you have lung condition. Acetaminophen  decreases pain and fever. It is available without a doctor's order. Ask how much to take and how often to take it. Follow directions. Read the labels of all other medicines you are using to see if they also contain acetaminophen, or ask your doctor or pharmacist. Acetaminophen can cause liver damage if not taken correctly. NSAIDs  help decrease swelling and pain or fever. This medicine is available with or without a doctor's order. NSAIDs can cause stomach bleeding or kidney problems in certain people. If you take blood thinner medicine, always ask your healthcare provider if NSAIDs are safe for you. Always read the medicine label and follow directions. Self-care:   Drink liquids as directed. You may need to drink more liquids than usual to stay hydrated. Ask how much liquid to drink each day and which liquids are best for you. Use a cool mist humidifier. This increases air moisture in your home. This may make it easier for you to breathe and help decrease your cough. Get more rest.  Rest helps your body to heal. Slowly start to do more each day. Rest when you feel it is needed. Prevent acute bronchitis:       Ask about vaccines you may need. Get a flu vaccine each year as soon as recommended, usually in September or October. Ask your healthcare provider if you should also get a pneumonia or COVID-19 vaccine. Your healthcare provider can tell you if you should also get other vaccines, and when to get them. Prevent the spread of germs. You can decrease your risk for acute bronchitis and other illnesses by doing the following:     Wash your hands often with soap and water. Carry germ-killing hand lotion or gel with you. You can use the lotion or gel to clean your hands when soap and water are not available.          Do not touch your eyes, nose, or mouth unless you have washed your hands first.    Always cover your mouth when you cough to prevent the spread of germs. It is best to cough into a tissue or your shirt sleeve instead of into your hand. Ask those around you to cover their mouths when they cough. Try to avoid people who have a cold or the flu. If you are sick, stay away from others as much as possible. Avoid irritants in the air. Avoid chemicals, fumes, and dust. Wear a face mask if you must work around dust or fumes. Stay inside on days when air pollution levels are high. If you have allergies, stay inside when pollen counts are high. Do not use aerosol products, such as spray-on deodorant, bug spray, and hair spray. Do not smoke or be around others who are smoking. Nicotine and other chemicals in cigarettes and cigars can cause lung damage. Ask your healthcare provider for information if you currently smoke and need help to quit. E-cigarettes or smokeless tobacco still contain nicotine. Talk to your healthcare provider before you use these products. Follow up with your doctor as directed:  Write down questions you have so you will remember to ask them during your follow-up visits. © Copyright Elisha Goltz 2023 Information is for End User's use only and may not be sold, redistributed or otherwise used for commercial purposes. The above information is an  only. It is not intended as medical advice for individual conditions or treatments. Talk to your doctor, nurse or pharmacist before following any medical regimen to see if it is safe and effective for you.

## 2023-11-21 NOTE — PROGRESS NOTES
Required Documentation:  Encounter provider Indu Ortiz PA-C    Provider located at [facility] [id number]    Identify all parties in room with patient during virtual visit:  No one else    The patient was identified by name and date of birth. Shelli Nunn was informed that this is a telemedicine visit and that the visit is being conducted through the [address] [place] [address] Dr platform. She agrees to proceed. .  My office door was closed. No one else was in the room. She acknowledged consent and understanding of privacy and security of the video platform. The patient has agreed to participate and understands they can discontinue the visit at any time. Verification of patient location:    Patient is located at home in the following state in which I hold an active license [place]    Patient is aware this is a billable service. Reason for visit is No chief complaint on file. Subjective  HPI   Pt reports uri sx x 2 weeks. Reports chest tightness, SOLIS and cough which is scantly productive of clear sputum. Endorses congestion and rhinorrhea. Tried mucinex and mucinex fast max without relief. Denies fevers, NVD, CP, leg swelling, recent travel, chance of pregnancy. Kids recently sick with similar sx. Past Medical History:   Diagnosis Date    Abnormal Pap smear of cervix     Hypothyroid     Migraine     Placental abruption 2020    Preeclampsia 2018       Past Surgical History:   Procedure Laterality Date     SECTION       SECTION      TONSILLECTOMY  2007        No Known Allergies    Review of Systems   Constitutional:  Negative for fever. HENT:  Negative for nosebleeds. Eyes:  Negative for redness. Respiratory:  Positive for cough, chest tightness and shortness of breath. Cardiovascular:  Negative for chest pain. Gastrointestinal:  Negative for blood in stool. Genitourinary:  Negative for hematuria. Musculoskeletal:  Negative for gait problem. Skin:  Negative for rash. Neurological:  Negative for seizures. Psychiatric/Behavioral:  Negative for behavioral problems. Video Exam    Vitals:    11/21/23 1312   Pulse: 93   Resp: 16   Temp: 98.7 °F (37.1 °C)       Physical Exam  Constitutional:       General: She is not in acute distress. Appearance: Normal appearance. She is not toxic-appearing. HENT:      Head: Normocephalic and atraumatic. Nose: No rhinorrhea. Mouth/Throat:      Mouth: Mucous membranes are moist.   Eyes:      Conjunctiva/sclera: Conjunctivae normal.   Pulmonary:      Effort: Pulmonary effort is normal. No respiratory distress. Breath sounds: No wheezing (no gross audible wheeze through computer). Comments: Cough is dry  Musculoskeletal:      Cervical back: Normal range of motion. Skin:     Findings: No rash (on face or neck). Neurological:      Mental Status: She is alert. Cranial Nerves: No dysarthria or facial asymmetry. Psychiatric:         Mood and Affect: Mood normal.         Behavior: Behavior normal.         Visit Time  Total Visit Duration: 9 minutes    Assessment/Plan:    Diagnoses and all orders for this visit:    Acute bronchitis, unspecified organism  -     methylPREDNISolone 4 MG tablet therapy pack; Use as directed on package  -     albuterol (Proventil HFA) 90 mcg/act inhaler; Inhale 2 puffs every 6 (six) hours as needed for wheezing        Patient Instructions   Schedule a follow-up appointment with your primary care physician for recheck in 2-3 days. If you cannot see your PCP, you can schedule a follow up appointment at a [facility]. Go to the emergency department if you develop any new or worsening symptoms including shortness of breath, chest pain, or anything else that is concerning. Excuses can be found in "Letters" section of [employer].  Can print if opened from a web [address] phone number is [phone] if you need assistance or have further questions    1 21 ) [place] ([phone])  Schedule or Reschedule Outpatient Testing - Option 2  Billing - Option 3  General Info - Option 4  MyChart Help - Option 5  Comprehensive Spine Program - Option 6   COVID - Option 7    Acute Bronchitis   WHAT YOU NEED TO KNOW:   Acute bronchitis is swelling and irritation in your lungs. It is usually caused by a virus and most often happens in the winter. Bronchitis may also be caused by bacteria or by a chemical irritant, such as smoke. DISCHARGE INSTRUCTIONS:   Return to the emergency department if:   You cough up blood. Your lips or fingernails turn blue. You feel like you are not getting enough air when you breathe. Call your doctor if:   Your symptoms do not go away or get worse, even after treatment. Your cough does not get better within 4 weeks. You have questions or concerns about your condition or care. Medicines: You may need any of the following:  Cough suppressants  decrease your urge to cough. Decongestants  help loosen mucus in your lungs and make it easier to cough up. This can help you breathe easier. Inhalers  may be given. Your healthcare provider may give you one or more inhalers to help you breathe easier and cough less. An inhaler gives you medicine to open your airways. Ask your healthcare provider to show you how to use your inhaler correctly. Antiviral medicine  treats infections caused by a virus. Antibiotics  may be given if your bronchitis is caused by bacteria or if you have lung condition. Acetaminophen  decreases pain and fever. It is available without a doctor's order. Ask how much to take and how often to take it. Follow directions.  Read the labels of all other medicines you are using to see if they also contain acetaminophen, or ask your doctor or pharmacist. Acetaminophen can cause liver damage if not taken correctly. NSAIDs  help decrease swelling and pain or fever. This medicine is available with or without a doctor's order. NSAIDs can cause stomach bleeding or kidney problems in certain people. If you take blood thinner medicine, always ask your healthcare provider if NSAIDs are safe for you. Always read the medicine label and follow directions. Self-care:   Drink liquids as directed. You may need to drink more liquids than usual to stay hydrated. Ask how much liquid to drink each day and which liquids are best for you. Use a cool mist humidifier. This increases air moisture in your home. This may make it easier for you to breathe and help decrease your cough. Get more rest.  Rest helps your body to heal. Slowly start to do more each day. Rest when you feel it is needed. Prevent acute bronchitis:       Ask about vaccines you may need. Get a flu vaccine each year as soon as recommended, usually in September or October. Ask your healthcare provider if you should also get a pneumonia or COVID-19 vaccine. Your healthcare provider can tell you if you should also get other vaccines, and when to get them. Prevent the spread of germs. You can decrease your risk for acute bronchitis and other illnesses by doing the following:     Wash your hands often with soap and water. Carry germ-killing hand lotion or gel with you. You can use the lotion or gel to clean your hands when soap and water are not available. Do not touch your eyes, nose, or mouth unless you have washed your hands first.    Always cover your mouth when you cough to prevent the spread of germs. It is best to cough into a tissue or your shirt sleeve instead of into your hand. Ask those around you to cover their mouths when they cough. Try to avoid people who have a cold or the flu. If you are sick, stay away from others as much as possible. Avoid irritants in the air.   Avoid chemicals, fumes, and dust. Wear a face mask if you must work around dust or fumes. Stay inside on days when air pollution levels are high. If you have allergies, stay inside when pollen counts are high. Do not use aerosol products, such as spray-on deodorant, bug spray, and hair spray. Do not smoke or be around others who are smoking. Nicotine and other chemicals in cigarettes and cigars can cause lung damage. Ask your healthcare provider for information if you currently smoke and need help to quit. E-cigarettes or smokeless tobacco still contain nicotine. Talk to your healthcare provider before you use these products. Follow up with your doctor as directed:  Write down questions you have so you will remember to ask them during your follow-up visits. © Copyright Brina Valenzuela 2023 Information is for End User's use only and may not be sold, redistributed or otherwise used for commercial purposes. The above information is an  only. It is not intended as medical advice for individual conditions or treatments. Talk to your doctor, nurse or pharmacist before following any medical regimen to see if it is safe and effective for you.

## 2023-11-21 NOTE — CARE ANYWHERE EVISITS
Visit Summary for Wendy Hargrove - Gender: Female - Date of Birth: [id number]  Date: [id number] - Duration: 8 minutes  Patient: Wendy Bazanbalaji  Provider: Michelle Galaviz PA-C    Patient Contact Information  Address  [address] [place] [address]; [address] [facility]  [phone]    Visit Topics  Cold [Added By: Self - 2023-11-21]    Triage Questions   What is your current physical address in the event of a medical emergency? Answer []  Are you allergic to any medications? Answer []  Are you now or could you be pregnant? Answer []  Do you have any immune system compromise or chronic lung   disease? Answer []  Do you have any vulnerable family members in the home (infant, pregnant, cancer, elderly)? Answer []     Conversation Transcripts  [0A][0A] [Notification] You are connected with Michelle aGlaviz PA-C, Urgent Care Specialist.[0A][Notification] ABDULKADIR Escalera is located in [place]. [0A][Notification] Wendy Hargrove has shared health history. [facility] .[0A]    Diagnosis  Acute bronchitis    Procedures  Value: [id number] Code: CPT-4 UNLISTED E&M SERVICE    Medications Prescribed    No prescriptions ordered    Electronically signed by: Salma Kahn(NPI [id number])

## 2023-11-24 ENCOUNTER — APPOINTMENT (OUTPATIENT)
Dept: LAB | Facility: HOSPITAL | Age: 38
End: 2023-11-24
Payer: COMMERCIAL

## 2023-11-24 DIAGNOSIS — E03.8 HYPOTHYROIDISM DUE TO HASHIMOTO'S THYROIDITIS: ICD-10-CM

## 2023-11-24 DIAGNOSIS — E06.3 HYPOTHYROIDISM DUE TO HASHIMOTO'S THYROIDITIS: ICD-10-CM

## 2023-11-24 LAB
T4 FREE SERPL-MCNC: 0.72 NG/DL (ref 0.61–1.12)
TSH SERPL DL<=0.05 MIU/L-ACNC: 0.87 UIU/ML (ref 0.45–4.5)

## 2023-11-24 PROCEDURE — 84443 ASSAY THYROID STIM HORMONE: CPT

## 2023-11-24 PROCEDURE — 84439 ASSAY OF FREE THYROXINE: CPT

## 2023-11-24 PROCEDURE — 36415 COLL VENOUS BLD VENIPUNCTURE: CPT

## 2023-11-27 DIAGNOSIS — E03.4 HYPOTHYROIDISM DUE TO ACQUIRED ATROPHY OF THYROID: Primary | Chronic | ICD-10-CM

## 2023-12-20 ENCOUNTER — OFFICE VISIT (OUTPATIENT)
Dept: URGENT CARE | Facility: CLINIC | Age: 38
End: 2023-12-20
Payer: COMMERCIAL

## 2023-12-20 VITALS
RESPIRATION RATE: 18 BRPM | BODY MASS INDEX: 31.18 KG/M2 | TEMPERATURE: 98.2 F | DIASTOLIC BLOOD PRESSURE: 84 MMHG | HEART RATE: 98 BPM | OXYGEN SATURATION: 99 % | SYSTOLIC BLOOD PRESSURE: 136 MMHG | WEIGHT: 194 LBS | HEIGHT: 66 IN

## 2023-12-20 DIAGNOSIS — B96.89 ACUTE BACTERIAL BRONCHITIS: Primary | ICD-10-CM

## 2023-12-20 DIAGNOSIS — J20.8 ACUTE BACTERIAL BRONCHITIS: Primary | ICD-10-CM

## 2023-12-20 PROCEDURE — 99213 OFFICE O/P EST LOW 20 MIN: CPT | Performed by: PHYSICIAN ASSISTANT

## 2023-12-20 RX ORDER — AZITHROMYCIN 250 MG/1
TABLET, FILM COATED ORAL
Qty: 6 TABLET | Refills: 0 | Status: SHIPPED | OUTPATIENT
Start: 2023-12-20 | End: 2023-12-24

## 2023-12-20 NOTE — PROGRESS NOTES
[facility]      NAME: Gwendolyn Jorge is a 38 y.o. female  : 1985    MRN: [id number]  DATE: 2023  TIME: 5:37 PM    Assessment and Plan   Acute bacterial bronchitis [J20.8, B96.89]  1. Acute bacterial bronchitis  azithromycin (ZITHROMAX) 250 mg tablet          Patient Instructions   I have prescribed an antibiotic for the infection.  Please take the antibiotic as prescribed and finish the entire prescription.  I recommend that the patient takes an over the counter probiotic or eats yogurt with live cultures in it (activia) to keep good bacteria in the gut and help prevent diarrhea.  Wash hands frequently to prevent the spread of infection.  Can use over the counter cough and cold medications to help with symptoms.  Ibuprofen and/or tylenol as needed for pain or fever.  If not improving over the next 3-5 days, follow up with PCP.    To present to the ER if symptoms worsen.  Chief Complaint     Chief Complaint   Patient presents with    Sinusitis     Started a week ago. Started in her head, sinus drainage, sore throat. Afraid it will go into her chest.            History of Present Illness   Gwendolyn Jorge presents to the clinic c/o    Sinusitis  This is a new problem. The current episode started 1 to 4 weeks ago. The problem has been gradually worsening since onset. There has been no fever. The pain is moderate. Associated symptoms include congestion, coughing, shortness of breath, sinus pressure and a sore throat. Pertinent negatives include no chills, diaphoresis, ear pain or headaches. Treatments tried: inhaler, mucinex. The treatment provided no relief.   - at home covid test    Review of Systems   Review of Systems   Constitutional:  Negative for chills, diaphoresis, fatigue and fever.   HENT:  Positive for congestion, postnasal drip, sinus pressure, sinus pain and sore throat. Negative for ear discharge, ear pain and facial swelling.    Eyes:  Negative for photophobia, pain,  discharge, redness, itching and visual disturbance.   Respiratory:  Positive for cough, shortness of breath and wheezing. Negative for apnea and chest tightness.    Cardiovascular:  Negative for chest pain and palpitations.   Gastrointestinal:  Negative for abdominal pain.   Skin:  Negative for color change, rash and wound.   Neurological:  Negative for dizziness and headaches.   Hematological:  Negative for adenopathy.         Current Medications     Long-Term Medications   Medication Sig Dispense Refill    levothyroxine 112 mcg tablet Take 1 tablet (112 mcg total) by mouth daily 90 tablet 0       Current Allergies     Allergies as of 2023    (No Known Allergies)            The following portions of the patient's history were reviewed and updated as appropriate: allergies, current medications, past family history, past medical history, past social history, past surgical history and problem list.  Past Medical History:   Diagnosis Date    Abnormal Pap smear of cervix     Hypothyroid     Migraine     Placental abruption 2020    Preeclampsia 2018     Past Surgical History:   Procedure Laterality Date     SECTION       SECTION      TONSILLECTOMY  2007     Social History     Socioeconomic History    Marital status: Single     Spouse name: Not on file    Number of children: Not on file    Years of education: Not on file    Highest education level: Not on file   Occupational History    Not on file   Tobacco Use    Smoking status: Never    Smokeless tobacco: Never   Vaping Use    Vaping status: Never Used   Substance and Sexual Activity    Alcohol use: Not Currently    Drug use: Never    Sexual activity: Not Currently     Partners: Male     Birth control/protection: Abstinence   Other Topics Concern    Not on file   Social History Narrative    , 2 children    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   Constitutional:       General: She is not in acute distress.     Appearance: She is well-developed. She is not diaphoretic.   HENT:      Head: Normocephalic and atraumatic.      Right Ear: Tympanic membrane and external ear normal.      Left Ear: Tympanic membrane and external ear normal.      Nose: Nose normal.      Mouth/Throat:      Mouth: Mucous membranes are moist.      Pharynx: No oropharyngeal exudate or posterior oropharyngeal erythema.   Eyes:      General: No scleral icterus.        Right eye: No discharge.         Left eye: No discharge.      Conjunctiva/sclera: Conjunctivae normal.   Cardiovascular:      Rate and Rhythm: Normal rate and regular rhythm.      Heart sounds: Normal heart sounds. No murmur heard.     No friction rub. No gallop.   Pulmonary:      Effort: Pulmonary effort is normal. No respiratory distress.      Breath sounds: Examination of the right-upper field reveals wheezing. Examination of the left-upper field reveals wheezing. Wheezing present. No decreased breath sounds, rhonchi or rales.   Skin:     General: Skin is warm and dry.      Coloration: Skin is not pale.      Findings: No erythema or rash.   Neurological:      Mental Status: She is alert and oriented to person, place, and time.   Psychiatric:         Behavior: Behavior normal.         Thought Content: Thought content normal.         Judgment: Judgment normal.         Jenae Wolf PA-C        "

## 2024-01-15 DIAGNOSIS — E03.8 HYPOTHYROIDISM DUE TO HASHIMOTO'S THYROIDITIS: ICD-10-CM

## 2024-01-15 DIAGNOSIS — E06.3 HYPOTHYROIDISM DUE TO HASHIMOTO'S THYROIDITIS: ICD-10-CM

## 2024-01-15 RX ORDER — LEVOTHYROXINE SODIUM 112 UG/1
112 TABLET ORAL DAILY
Qty: 90 TABLET | Refills: 0 | Status: SHIPPED | OUTPATIENT
Start: 2024-01-15 | End: 2024-04-14

## 2024-03-07 ENCOUNTER — OFFICE VISIT (OUTPATIENT)
Dept: OBGYN CLINIC | Facility: OTHER | Age: 39
End: 2024-03-07
Payer: COMMERCIAL

## 2024-03-07 ENCOUNTER — APPOINTMENT (OUTPATIENT)
Dept: RADIOLOGY | Facility: OTHER | Age: 39
End: 2024-03-07
Payer: COMMERCIAL

## 2024-03-07 VITALS
DIASTOLIC BLOOD PRESSURE: 79 MMHG | HEIGHT: 66 IN | HEART RATE: 82 BPM | SYSTOLIC BLOOD PRESSURE: 116 MMHG | BODY MASS INDEX: 31.66 KG/M2 | WEIGHT: 197 LBS

## 2024-03-07 DIAGNOSIS — M25.512 ACUTE PAIN OF LEFT SHOULDER: ICD-10-CM

## 2024-03-07 DIAGNOSIS — M19.012 ARTHRITIS OF LEFT ACROMIOCLAVICULAR JOINT: Primary | ICD-10-CM

## 2024-03-07 PROCEDURE — 20605 DRAIN/INJ JOINT/BURSA W/O US: CPT | Performed by: ORTHOPAEDIC SURGERY

## 2024-03-07 PROCEDURE — 73030 X-RAY EXAM OF SHOULDER: CPT

## 2024-03-07 PROCEDURE — 99204 OFFICE O/P NEW MOD 45 MIN: CPT | Performed by: ORTHOPAEDIC SURGERY

## 2024-03-07 RX ORDER — BUPIVACAINE HYDROCHLORIDE 5 MG/ML
2 INJECTION, SOLUTION EPIDURAL; INTRACAUDAL
Status: COMPLETED | OUTPATIENT
Start: 2024-03-07 | End: 2024-03-07

## 2024-03-07 RX ORDER — BETAMETHASONE SODIUM PHOSPHATE AND BETAMETHASONE ACETATE 3; 3 MG/ML; MG/ML
6 INJECTION, SUSPENSION INTRA-ARTICULAR; INTRALESIONAL; INTRAMUSCULAR; SOFT TISSUE
Status: COMPLETED | OUTPATIENT
Start: 2024-03-07 | End: 2024-03-07

## 2024-03-07 RX ADMIN — BETAMETHASONE SODIUM PHOSPHATE AND BETAMETHASONE ACETATE 6 MG: 3; 3 INJECTION, SUSPENSION INTRA-ARTICULAR; INTRALESIONAL; INTRAMUSCULAR; SOFT TISSUE at 10:00

## 2024-03-07 RX ADMIN — BUPIVACAINE HYDROCHLORIDE 2 ML: 5 INJECTION, SOLUTION EPIDURAL; INTRACAUDAL at 10:00

## 2024-03-07 NOTE — PROGRESS NOTES
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 The pathoanatomy and natural history of this diagnosis was explained to the patient in detail today in the office. She understood and all questions were answered.   She was provided with a cortisone injection today into her left AC Joint. This is documented approprietly below  May begin formal physical therapy/HEP  Follow up in 6-8 weeks for re evaluation of symptoms    Subjective:   Patient ID: Gwendolyn Jorge is a 38 y.o. female presents with a chief complaint of left shoulder pain.   The pain began a few month(s) ago and is not associated with an acute injury.  Although patient reports falling down stairs in November. The patient describes the pain as aching and dull in intensity,  intermittent in timing, and localizes the pain to the  left AC joint.  The pain is worse with overuse and raising arm over head and relieved by rest, ice, avoiding the painful activities.  The pain is not associated with numbness and tingling.  The pain is not associated with constitutional symptoms. The patient is not awoken at night by the pain.    The patient has had no prior treatment.    The following portions of the patient's history were reviewed and updated as appropriate: allergies, current medications, past family history, past medical history, past social history, past surgical history and problem list    Objective:  Ht 5' 6\" (1.676 m)   Wt 89.4 kg (197 lb)   BMI 31.80 kg/m²       Left Shoulder Exam     Tenderness   The patient is experiencing tenderness in the acromioclavicular joint.    Range of Motion   The patient has normal left shoulder ROM.    Muscle Strength   Abduction: 5/5   External rotation: 5/5     Tests   Cross arm: positive  Drop arm: negative    Other "   Erythema: absent  Sensation: normal  Pulse: present             Physical Exam  Medium joint arthrocentesis: L acromioclavicular  Universal Protocol:  Consent: Verbal consent obtained.  Risks and benefits: risks, benefits and alternatives were discussed  Consent given by: patient  Site marked: the operative site was marked  Radiology Images displayed and confirmed. If images not available, report reviewed: imaging studies available  Patient identity confirmed: verbally with patient  Supporting Documentation  Indications: pain and diagnostic evaluation   Procedure Details  Location: shoulder - L acromioclavicular  Preparation: Patient was prepped and draped in the usual sterile fashion  Needle size: 22 G  Ultrasound guidance: no  Approach: superior  Medications administered: 2 mL bupivacaine (PF) 0.5 %; 6 mg betamethasone acetate-betamethasone sodium phosphate 6 (3-3) mg/mL    Patient tolerance: patient tolerated the procedure well with no immediate complications  Dressing:  Sterile dressing applied        I have personally reviewed pertinent films in PACS and my interpretation is as follows.    X Ray Left Shoulder 3/7/24: Mild to moderate ac joint osteoarthritis. No other acute osseous abnormalities.     Scribe Attestation      I,:  Amish Jimenez am acting as a scribe while in the presence of the attending physician.:       I,:  Armand Price MD personally performed the services described in this documentation    as scribed in my presence.:

## 2024-03-11 ENCOUNTER — EVALUATION (OUTPATIENT)
Dept: PHYSICAL THERAPY | Facility: REHABILITATION | Age: 39
End: 2024-03-11
Payer: COMMERCIAL

## 2024-03-11 DIAGNOSIS — M19.012 ARTHRITIS OF LEFT ACROMIOCLAVICULAR JOINT: Primary | ICD-10-CM

## 2024-03-11 DIAGNOSIS — M19.012 ARTHRITIS OF LEFT ACROMIOCLAVICULAR JOINT: ICD-10-CM

## 2024-03-11 PROCEDURE — 97110 THERAPEUTIC EXERCISES: CPT | Performed by: PHYSICAL THERAPIST

## 2024-03-11 PROCEDURE — 97162 PT EVAL MOD COMPLEX 30 MIN: CPT | Performed by: PHYSICAL THERAPIST

## 2024-03-11 NOTE — PROGRESS NOTES
PT Evaluation     Today's date: 3/11/2024  Patient name: Gwendolyn Jorge  : 1985  MRN: [id number]  Referring provider: Armand Price*  Dx:   Encounter Diagnosis     ICD-10-CM    1. Left hip pain  M25.552                      Assessment  Assessment details: Pt is a 39 yo female with left shoulder pain that began for no apparent reason in October.  Pain is nearly constant with episodes where it becomes more intense and reduces her functional mobility.  Xrays show AC arthritis.  She presents with mildly limited ROM and strength and special tests and location of pain are consistent with AC arthritis.  Scapular mm strength is limited and she presents with bilateral winging.  She would benefit from therapeutic exercise to improve shoulder mechanics and decrease pain with movement.    Impairments: abnormal or restricted ROM, activity intolerance, lacks appropriate home exercise program, pain with function and poor posture     Symptom irritability: moderateUnderstanding of Dx/Px/POC: excellent  Goals  STG: in 4 weeks  Increase strength 1/2 grade  Pt can reduce pain with HEP when it increases  Performing HEP consistently  LTG: by d/c  Strength WNL  Able to carry her 3 yo without difficulty  Sleep undisturbed  Put on coat consistently without pain    Plan  Plan details: Pt works 3 12 hour days so it is difficult to schedule more than 1x a week.   Patient would benefit from: skilled physical therapy  Referral necessary: No  Planned therapy interventions: kinesiology taping, joint mobilization, neuromuscular re-education, patient education, strengthening, stretching, therapeutic exercise and home exercise program  Frequency: 1x week  Duration in weeks: 8  Treatment plan discussed with: patient        Subjective Evaluation    History of Present Illness  Mechanism of injury: Pain began around Oct. No specific onset but she does carry her 3 yo daughter in that arm.   Pt had an injection on 3/7 which reduced pain  for a couple days but she is painful again.     Patient Goals  Patient goals for therapy: decreased pain  Patient goal: avoid additional interventions, sleep better  Pain  Current pain ratin  At best pain ratin  At worst pain ratin  Location: Superior shoulder  Quality: dull ache  Relieving factors: medications  Exacerbated by: Sleeping on it, leaning on the shoulder, putting on the shoulder.    Life stress: nursing post op, some moving of patients, picking up a 3 yo    Treatments  Previous treatment: injection treatment        Objective     Postural Observations  Seated posture: fair  Standing posture: fair  Correction of posture: has no consistent effect    Additional Postural Observation Details  Shoulder protraction    Tenderness     Left Shoulder   Tenderness in the AC joint.     Cervical/Thoracic Screen   Cervical range of motion within normal limits    Active Range of Motion   Left Shoulder   Flexion: 160 degrees with pain  Extension: 65 degrees   Abduction: 165 degrees with pain  External rotation BTH: T3   Internal rotation BTB: T7 with pain  Horizontal adduction: with pain    Right Shoulder   Flexion: 170 degrees   Extension: 85 degrees   Abduction: 170 degrees   External rotation BTH: T3   Internal rotation BTB: T5     Additional Active Range of Motion Details  Mild pain w/above as marked    Scapular Mobility   Left Shoulder   Scapular Mobility with Shoulder to 90° FF   Scapular winging: moderate  Scapular elevation: moderate    Scapular Mobility beyond 90° FF   Scapular winging: moderate  Scapular elevation: moderate    Right Shoulder   Scapular Mobility with Shoulder to 90° FF   Scapular winging: moderate  Scapular elevation: moderate    Scapular Mobility beyond 90° FF   Scapular winging: moderate  Scapular elevation: moderate    Joint Play   Left Shoulder  Joints within functional limits are the anterior capsule, posterior capsule and inferior capsule. Hypomobile in the AC joint.    Right  "Shoulder  Joints within functional limits are the anterior capsule, posterior capsule and inferior capsule.     Strength/Myotome Testing     Left Shoulder     Planes of Motion   Flexion: 4 (Pain)   Extension: 5   Abduction: 5   External rotation at 0°: 5 (Pain)   Internal rotation at 0°: 5     Isolated Muscles   Biceps: 5   Lower trapezius: 3+   Pectoralis major: 3+   Serratus anterior: 4   Triceps: 5     Right Shoulder     Planes of Motion   Flexion: 4   Extension: 5   Abduction: 5   External rotation at 0°: 5   Internal rotation at 0°: 5     Isolated Muscles   Biceps: 5   Lower trapezius: 4   Pectoralis major: 4   Serratus anterior: 5   Triceps: 5     Tests     Left Shoulder   Positive AC shear.   Negative Hawkin's, Neer's and Speed's.     General Comments:      Shoulder Comments   Shoulder repeated movement evaluation:  Extension in internal rotation: improved IR, increased strength with flexion, no change in pain  IR: Increased pain, no worse, no improvement  Horizontal abduction: increased pain, no worse, no improvement             Precautions: none      Daily Treatment Diary     Assessment 3/11            Eval/Reval             FOTO     **     **   HEP Issued              Manuals             KT?                                                    Exercise Diary              UBE             Prone Y 5\"x10            Prone T 5\"x10            Scapular retr 5\"x10                                      Standing shoulder ext w/table in IR 5\"x10                                                                                                                                 Modalities                                          "

## 2024-03-21 ENCOUNTER — OFFICE VISIT (OUTPATIENT)
Dept: PHYSICAL THERAPY | Facility: REHABILITATION | Age: 39
End: 2024-03-21
Payer: COMMERCIAL

## 2024-03-21 DIAGNOSIS — M19.012 ARTHRITIS OF LEFT ACROMIOCLAVICULAR JOINT: Primary | ICD-10-CM

## 2024-03-21 PROCEDURE — 97110 THERAPEUTIC EXERCISES: CPT | Performed by: PHYSICAL THERAPIST

## 2024-03-21 PROCEDURE — 97140 MANUAL THERAPY 1/> REGIONS: CPT | Performed by: PHYSICAL THERAPIST

## 2024-03-27 ENCOUNTER — APPOINTMENT (OUTPATIENT)
Dept: PHYSICAL THERAPY | Facility: REHABILITATION | Age: 39
End: 2024-03-27
Payer: COMMERCIAL

## 2024-04-03 ENCOUNTER — OFFICE VISIT (OUTPATIENT)
Dept: PHYSICAL THERAPY | Facility: REHABILITATION | Age: 39
End: 2024-04-03
Payer: COMMERCIAL

## 2024-04-03 DIAGNOSIS — M19.012 ARTHRITIS OF LEFT ACROMIOCLAVICULAR JOINT: Primary | ICD-10-CM

## 2024-04-03 PROCEDURE — 97110 THERAPEUTIC EXERCISES: CPT | Performed by: PHYSICAL THERAPIST

## 2024-04-03 PROCEDURE — 97140 MANUAL THERAPY 1/> REGIONS: CPT | Performed by: PHYSICAL THERAPIST

## 2024-04-03 NOTE — PROGRESS NOTES
"Daily Note     Today's date: 4/3/2024  Patient name: Gwendolyn Jorge  : 1985  MRN: [id number]  Referring provider: Armand Price*  Dx:   Encounter Diagnosis     ICD-10-CM    1. Arthritis of left acromioclavicular joint  M19.012                        Subjective: Pain continues to occur intermittently.  States, \"I've been a little sore lately.\"      Objective: See treatment diary below  Left Shoulder   Flexion: 165 degrees with pain  Extension: 65 degrees   Abduction: 170 degrees with pain  External rotation BTH: T3   Internal rotation BTB: T6 with pain  Repeated cervical    Assessment: Tolerated treatment well. ROM has improved but she continued to have pain at end range.  This resolved after mobilization.  Scapular stability is also improving.  Patient would benefit from continued PT      Plan: Continue per plan of care.        Precautions: none      Daily Treatment Diary     Assessment 3/11 3/21 4/3          Eval/Reval             FOTO     **     **   HEP Issued              Manuals             GH post/inf gliacrolyn   NT          AC post glicarolyn   NT                                    Exercise Diary              UBE  3' BW 3'/3'          Prone Y 5\"x10 5\"x10 5\" 2x10          Prone T, I  5\"x10 5\"2x10 5\" 2x10          Prone ER w/retr   5\"x10          Scapular retr 5\"x10 5\"2x10 TB rows 2x10          Serratus wall slides  2x10 2x10 w/pTB                       Standing shoulder ext w/table in IR 5\"x10 2x10                                                                                                                                Modalities                          Access Code: [email or web address]  URL: [email or web address]  Date: 2024  Prepared by: Anne Saunders    Exercises  - Prone Scapular Retraction Arms at Side  - 1 x daily - 7 x weekly - 1 sets - 10 reps  - Prone Scapular Slide with Shoulder Extension  - 1 x daily - 7 x weekly - 1 sets - 10 reps  - Standing Scapular Retraction  - 1 x daily - 7 " x weekly - 1 sets - 10 reps  - Serratus Activation at Wall with Foam Roll and Resistance Band  - 1 x daily - 7 x weekly - 2 sets - 10 reps  - Prone Shoulder Flexion  - 1 x daily - 7 x weekly - 2 sets - 10 reps  - Standing Bilateral Low Shoulder Row with Anchored Resistance  - 1 x daily - 7 x weekly - 2 sets - 10 reps - 5 sec hold  - Prone Scapular Retraction with Shoulder External Rotation in Abduction  - 1 x daily - 7 x weekly - 2 sets - 10 reps - 5 sec hold  - Seated Cervical Retraction and Extension  - 1 x daily - 7 x weekly - 1-2 sets - 10 reps

## 2024-04-09 ENCOUNTER — OFFICE VISIT (OUTPATIENT)
Dept: PHYSICAL THERAPY | Facility: REHABILITATION | Age: 39
End: 2024-04-09
Payer: COMMERCIAL

## 2024-04-09 DIAGNOSIS — M19.012 ARTHRITIS OF LEFT ACROMIOCLAVICULAR JOINT: Primary | ICD-10-CM

## 2024-04-09 PROCEDURE — 97110 THERAPEUTIC EXERCISES: CPT | Performed by: PHYSICAL THERAPIST

## 2024-04-09 NOTE — PROGRESS NOTES
"Daily Note     Today's date: 2024  Patient name: Gwendolyn Jorge  : 1985  MRN: [id number]  Referring provider: Armand Prcie*  Dx:   Encounter Diagnosis     ICD-10-CM    1. Arthritis of left acromioclavicular joint  M19.012                          Subjective: Pain continues to occur intermittently.  States, \"I've been a little sore lately.\"      Objective: See treatment diary below      Assessment: ROM is full with only mild pulling at end range flexion.  Strength is WNL and she did not have pain with testing.  She has met her goals and is ready for discharge.       Plan: Discharge.         Precautions: none      Daily Treatment Diary     Assessment 3/11 3/21 4/3 4/9         Eval/Reval             FOTO     **     **   HEP Issued              Manuals             GH post/inf glides   NT          AC post glides   NT NT                                   Exercise Diary              UBE  3' BW 3'/3' 3'/3'         Prone Y 5\"x10 5\"x10 5\" 2x10 5\" 2x10         Prone T, I  5\"x10 5\"2x10 5\" 2x10 5\" 2x10         Prone ER w/retr   5\"x10 5\"2x10         Scapular retr 5\"x10 5\"2x10 TB rows 2x10 BTB 2x10         Serratus wall slides  2x10 2x10 w/pTB 2x10 PTB         TB ext    BTB 5\" x20         Standing shoulder ext w/table in IR 5\"x10 2x10           TB ER B/L    5\"x20                                                                                                                 Modalities                          Access Code: [email or web address]  URL: [email or web address]  Date: 2024  Prepared by: Anne Saunders    Exercises  - Prone Scapular Retraction Arms at Side  - 1 x daily - 7 x weekly - 1 sets - 10 reps  - Prone Scapular Slide with Shoulder Extension  - 1 x daily - 7 x weekly - 1 sets - 10 reps  - Standing Scapular Retraction  - 1 x daily - 7 x weekly - 1 sets - 10 reps  - Serratus Activation at Wall with Foam Roll and Resistance Band  - 1 x daily - 7 x weekly - 2 sets - 10 reps  - Prone Shoulder " Flexion  - 1 x daily - 7 x weekly - 2 sets - 10 reps  - Standing Bilateral Low Shoulder Row with Anchored Resistance  - 1 x daily - 7 x weekly - 2 sets - 10 reps - 5 sec hold  - Prone Scapular Retraction with Shoulder External Rotation in Abduction  - 1 x daily - 7 x weekly - 2 sets - 10 reps - 5 sec hold  - Seated Cervical Retraction and Extension  - 1 x daily - 7 x weekly - 1-2 sets - 10 reps

## 2024-05-16 ENCOUNTER — OFFICE VISIT (OUTPATIENT)
Dept: ENDOCRINOLOGY | Facility: CLINIC | Age: 39
End: 2024-05-16
Payer: COMMERCIAL

## 2024-05-16 ENCOUNTER — APPOINTMENT (OUTPATIENT)
Dept: LAB | Facility: CLINIC | Age: 39
End: 2024-05-16
Payer: COMMERCIAL

## 2024-05-16 VITALS
OXYGEN SATURATION: 99 % | TEMPERATURE: 98.2 F | DIASTOLIC BLOOD PRESSURE: 74 MMHG | HEART RATE: 73 BPM | HEIGHT: 66 IN | BODY MASS INDEX: 31.53 KG/M2 | WEIGHT: 196.2 LBS | SYSTOLIC BLOOD PRESSURE: 122 MMHG

## 2024-05-16 DIAGNOSIS — E03.4 HYPOTHYROIDISM DUE TO ACQUIRED ATROPHY OF THYROID: Primary | Chronic | ICD-10-CM

## 2024-05-16 DIAGNOSIS — E03.8 HYPOTHYROIDISM DUE TO HASHIMOTO'S THYROIDITIS: ICD-10-CM

## 2024-05-16 DIAGNOSIS — E03.4 HYPOTHYROIDISM DUE TO ACQUIRED ATROPHY OF THYROID: Chronic | ICD-10-CM

## 2024-05-16 DIAGNOSIS — E06.3 HYPOTHYROIDISM DUE TO HASHIMOTO'S THYROIDITIS: ICD-10-CM

## 2024-05-16 PROCEDURE — 99213 OFFICE O/P EST LOW 20 MIN: CPT | Performed by: STUDENT IN AN ORGANIZED HEALTH CARE EDUCATION/TRAINING PROGRAM

## 2024-05-16 PROCEDURE — 36415 COLL VENOUS BLD VENIPUNCTURE: CPT

## 2024-05-16 PROCEDURE — 84439 ASSAY OF FREE THYROXINE: CPT

## 2024-05-16 PROCEDURE — 84443 ASSAY THYROID STIM HORMONE: CPT

## 2024-05-16 RX ORDER — LEVOTHYROXINE SODIUM 112 UG/1
112 TABLET ORAL DAILY
Qty: 90 TABLET | Refills: 1 | Status: SHIPPED | OUTPATIENT
Start: 2024-05-16

## 2024-05-16 NOTE — ASSESSMENT & PLAN NOTE
She is clinically euthyroid, on levothyroxine 112 mcg once daily which she is taking it regularly and properly.  No recent TFT which was ordered, this will be followed-up and we will adjust levothyroxine if necessary.  Advised that levotyroxine should be taken on an empty stomach. Should avoid taking medications like iron, calcium, tums, [id number], PPI at the same time that may decrease absorption of T4. Should separate administration by 4hrs.

## 2024-05-16 NOTE — PROGRESS NOTES
Gwendolyn Jorge 39 y.o. female MRN: [id number]    Encounter: [id number]      Assessment & Plan     Problem List Items Addressed This Visit          Endocrine    Hypothyroidism due to acquired atrophy of thyroid - Primary (Chronic)     She is clinically euthyroid, on levothyroxine 112 mcg once daily which she is taking it regularly and properly.  No recent TFT which was ordered, this will be followed-up and we will adjust levothyroxine if necessary.  Advised that levotyroxine should be taken on an empty stomach. Should avoid taking medications like iron, calcium, tums, [id number], PPI at the same time that may decrease absorption of T4. Should separate administration by 4hrs.           Relevant Medications    levothyroxine 112 mcg tablet    Other Relevant Orders    T4, free    TSH, 3rd generation     Other Visit Diagnoses       Hypothyroidism due to Hashimoto's thyroiditis        Relevant Medications    levothyroxine 112 mcg tablet            CC:   Hypothyroidism,    History of Present Illness     HPI:  Gwendolyn Jorge is a 39 year-old female who presents for follow-up for hypothyroidism.  She was seen and evaluated last in October 2023.  She is currently on levothyroxine 112 mcg once daily,    She is currently on levothyroxine 112 mcg once daily,    Review of Systems   Constitutional:  Negative for appetite change, fatigue and unexpected weight change.   HENT:  Negative for trouble swallowing and voice change.    Eyes:  Negative for visual disturbance.   Respiratory:  Negative for cough, shortness of breath and wheezing.    Cardiovascular:  Negative for palpitations and leg swelling.   Gastrointestinal:  Negative for abdominal pain, constipation, diarrhea, nausea and vomiting.   Endocrine: Negative for cold intolerance, heat intolerance, polyphagia and polyuria.   Musculoskeletal:  Negative for arthralgias.   Skin:  Negative for color change, rash and wound.   Neurological:  Negative for dizziness, tremors,  weakness, light-headedness, numbness and headaches.   Psychiatric/Behavioral:  Negative for agitation and sleep disturbance. The patient is not nervous/anxious.        Historical Information   Past Medical History:   Diagnosis Date    Abnormal Pap smear of cervix     Hypothyroid     Migraine     Placental abruption 2020    Preeclampsia 2018     Past Surgical History:   Procedure Laterality Date     SECTION       SECTION      TONSILLECTOMY  2007     Social History   Social History     Substance and Sexual Activity   Alcohol Use Not Currently     Social History     Substance and Sexual Activity   Drug Use Never     Social History     Tobacco Use   Smoking Status Never   Smokeless Tobacco Never     Family History:   Family History   Problem Relation Age of Onset    Hypertension Mother     Hyperlipidemia Mother     Asthma Mother     Migraines Mother     Thyroid disease Mother     Diabetes type II Maternal Grandfather     Hypertension Maternal Grandfather     Hyperlipidemia Maternal Grandfather     Diabetes Maternal Grandfather     Heart attack Maternal Grandfather     Heart disease Maternal Grandfather     Heart failure Maternal Grandfather     Hypertension Maternal Grandmother     Hyperlipidemia Maternal Grandmother     Heart disease Maternal Grandmother     Migraines Maternal Grandmother     Hypertension Paternal Grandmother        Meds/Allergies   Current Outpatient Medications   Medication Sig Dispense Refill    albuterol (Proventil HFA) 90 mcg/act inhaler Inhale 2 puffs every 6 (six) hours as needed for wheezing 6.7 g 0    Aspirin-Acetaminophen-Caffeine (EXCEDRIN MIGRAINE PO) Take by mouth      levothyroxine 112 mcg tablet Take 1 tablet (112 mcg total) by mouth daily 90 tablet 0    methylPREDNISolone 4 MG tablet therapy pack Use as directed on package (Patient not taking: Reported on 2023) 21 tablet 0     No current facility-administered medications for this visit.     No  "Known Allergies    Objective   Vitals: There were no vitals taken for this visit.    Physical Exam  Constitutional:       General: She is not in acute distress.     Appearance: Normal appearance. She is not ill-appearing, toxic-appearing or diaphoretic.   HENT:      Head: Normocephalic and atraumatic.   Eyes:      Extraocular Movements: Extraocular movements intact.   Pulmonary:      Effort: Pulmonary effort is normal. No respiratory distress.   Neurological:      Mental Status: She is alert and oriented to person, place, and time.         The history was obtained from the review of the chart, patient.    Lab Results:   Lab Results   Component Value Date/Time    TSH 3RD GENERATON 0.874 11/24/2023 01:42 PM    TSH 3RD GENERATON 1.076 09/07/2023 08:22 AM    TSH 3RD GENERATON 7.256 (H) 06/30/2023 06:48 AM    Free T4 0.72 11/24/2023 01:42 PM    Free T4 0.93 09/07/2023 08:22 AM    Free T4 0.64 06/30/2023 06:48 AM       Imaging Studies:       I have personally reviewed pertinent reports.      Portions of the record may have been created with voice recognition software. Occasional wrong word or \"sound a like\" substitutions may have occurred due to the inherent limitations of voice recognition software. Read the chart carefully and recognize, using context, where substitutions have occurred.    "

## 2024-05-17 LAB
T4 FREE SERPL-MCNC: 0.74 NG/DL (ref 0.61–1.12)
TSH SERPL DL<=0.05 MIU/L-ACNC: 1.74 UIU/ML (ref 0.45–4.5)

## 2024-06-27 ENCOUNTER — OFFICE VISIT (OUTPATIENT)
Dept: FAMILY MEDICINE CLINIC | Facility: CLINIC | Age: 39
End: 2024-06-27
Payer: COMMERCIAL

## 2024-06-27 ENCOUNTER — APPOINTMENT (OUTPATIENT)
Dept: LAB | Facility: CLINIC | Age: 39
End: 2024-06-27
Payer: COMMERCIAL

## 2024-06-27 VITALS
OXYGEN SATURATION: 98 % | HEART RATE: 77 BPM | TEMPERATURE: 96.7 F | SYSTOLIC BLOOD PRESSURE: 114 MMHG | WEIGHT: 198 LBS | DIASTOLIC BLOOD PRESSURE: 78 MMHG | HEIGHT: 66 IN | BODY MASS INDEX: 31.82 KG/M2

## 2024-06-27 DIAGNOSIS — Z13.220 LIPID SCREENING: ICD-10-CM

## 2024-06-27 DIAGNOSIS — Z13.1 ENCOUNTER FOR SCREENING EXAMINATION FOR IMPAIRED GLUCOSE REGULATION AND DIABETES MELLITUS: ICD-10-CM

## 2024-06-27 DIAGNOSIS — E55.9 VITAMIN D DEFICIENCY: ICD-10-CM

## 2024-06-27 DIAGNOSIS — Z12.31 ENCOUNTER FOR SCREENING MAMMOGRAM FOR BREAST CANCER: ICD-10-CM

## 2024-06-27 DIAGNOSIS — Z00.00 ANNUAL PHYSICAL EXAM: Primary | ICD-10-CM

## 2024-06-27 LAB
25(OH)D3 SERPL-MCNC: 25.4 NG/ML (ref 30–100)
CHOLEST SERPL-MCNC: 217 MG/DL
EST. AVERAGE GLUCOSE BLD GHB EST-MCNC: 114 MG/DL
HBA1C MFR BLD: 5.6 %
HDLC SERPL-MCNC: 75 MG/DL
LDLC SERPL CALC-MCNC: 120 MG/DL (ref 0–100)
NONHDLC SERPL-MCNC: 142 MG/DL
TRIGL SERPL-MCNC: 111 MG/DL

## 2024-06-27 PROCEDURE — 82306 VITAMIN D 25 HYDROXY: CPT

## 2024-06-27 PROCEDURE — 99396 PREV VISIT EST AGE 40-64: CPT | Performed by: FAMILY MEDICINE

## 2024-06-27 PROCEDURE — 83036 HEMOGLOBIN GLYCOSYLATED A1C: CPT

## 2024-06-27 PROCEDURE — 36415 COLL VENOUS BLD VENIPUNCTURE: CPT

## 2024-06-27 PROCEDURE — 80061 LIPID PANEL: CPT

## 2024-06-27 NOTE — PROGRESS NOTES
Adult Annual Physical  Name: Gwendolyn Jorge      : 1985      MRN: [id number]  Encounter Provider: Heather Forbes DO  Encounter Date: 2024   Encounter department: [facility]    Assessment & Plan   1. Annual physical exam  2. Encounter for screening mammogram for breast cancer  -     Mammo screening bilateral w 3d & cad; Future; Expected date: 2024  3. Lipid screening  -     Lipid panel; Future  4. Encounter for screening examination for impaired glucose regulation and diabetes mellitus  -     Hemoglobin A1C; Future  5. Vitamin D deficiency  -     Vitamin D 25 hydroxy; Future    Immunizations and preventive care screenings were discussed with patient today. Appropriate education was printed on patient's after visit summary.    Counseling:  Anticipatory guidance         History of Present Illness     Adult Annual Physical:  Patient presents for annual physical. Vit D was a little low last year- pt does not take any vitamins or supplements - states was getting too expensive.     Diet and Physical Activity:  - Diet/Nutrition: well balanced diet.  - Exercise: walking.    Depression Screening:  - PHQ-2 Score: 2    General Health:  - Sleep:. 6 hours on average  - Hearing: normal hearing bilateral ears.  - Vision: no vision problems.  - Dental: regular dental visits.    /GYN Health:  - Follows with GYN: yes.   - Menopause: premenopausal.     Advanced Care Planning:  - Has an advanced directive?: no    - Has a durable medical POA?: no    - ACP document given to patient?: yes      Review of Systems   Constitutional: Negative.    Respiratory: Negative.     Cardiovascular: Negative.    Gastrointestinal:  Positive for constipation (sometimes - takes magnesium as needed with good relief). Negative for abdominal distention, abdominal pain, anal bleeding, blood in stool, diarrhea, nausea, rectal pain and vomiting.   Hematological: Negative.    All other systems reviewed and are  "negative.      Objective     /78 (BP Location: Left arm, Patient Position: Sitting, Cuff Size: Adult)   Pulse 77   Temp (!) 96.7 °F (35.9 °C)   Ht 5' 6\" (1.676 m)   Wt 89.8 kg (198 lb)   SpO2 98%   BMI 31.96 kg/m²     Physical Exam  Vitals and nursing note reviewed.   Constitutional:       General: She is not in acute distress.     Appearance: She is well-developed. She is not ill-appearing, toxic-appearing or diaphoretic.   HENT:      Head: Normocephalic and atraumatic.      Right Ear: Tympanic membrane, ear canal and external ear normal.      Left Ear: Tympanic membrane, ear canal and external ear normal.      Nose: Nose normal.      Mouth/Throat:      Lips: Pink.      Mouth: Mucous membranes are moist.      Pharynx: Oropharynx is clear. Uvula midline.      Tonsils: 0 on the right. 0 on the left.   Eyes:      General: Lids are normal.      Extraocular Movements: Extraocular movements intact.      Conjunctiva/sclera: Conjunctivae normal.      Pupils: Pupils are equal, round, and reactive to light.   Neck:      Thyroid: No thyroid mass, thyromegaly or thyroid tenderness.      Vascular: No JVD.      Trachea: Trachea and phonation normal.   Cardiovascular:      Rate and Rhythm: Normal rate and regular rhythm.      Pulses: Normal pulses.      Heart sounds: Normal heart sounds.   Pulmonary:      Effort: Pulmonary effort is normal.      Breath sounds: Normal breath sounds and air entry.   Abdominal:      General: Bowel sounds are normal. There is no distension or abdominal bruit.      Palpations: Abdomen is soft. There is no hepatomegaly, splenomegaly or mass.      Tenderness: There is no abdominal tenderness.      Hernia: There is no hernia in the ventral area.   Musculoskeletal:      Cervical back: Neck supple.      Right lower leg: No edema.      Left lower leg: No edema.   Lymphadenopathy:      Cervical: No cervical adenopathy.   Skin:     General: Skin is warm and dry.      Capillary Refill: Capillary " refill takes less than 2 seconds.      Coloration: Skin is not pale.   Neurological:      Mental Status: She is alert and oriented to person, place, and time.      Cranial Nerves: Cranial nerves 2-12 are intact.      Sensory: Sensation is intact.      Motor: Motor function is intact.      Coordination: Coordination is intact.      Gait: Gait normal.      Deep Tendon Reflexes: Reflexes are normal and symmetric.   Psychiatric:         Mood and Affect: Mood normal.         Behavior: Behavior normal. Behavior is cooperative.   Administrative Statements

## 2024-07-20 ENCOUNTER — OFFICE VISIT (OUTPATIENT)
Dept: URGENT CARE | Age: 39
End: 2024-07-20
Payer: COMMERCIAL

## 2024-07-20 VITALS
OXYGEN SATURATION: 98 % | SYSTOLIC BLOOD PRESSURE: 138 MMHG | TEMPERATURE: 97.6 F | WEIGHT: 199 LBS | HEART RATE: 91 BPM | DIASTOLIC BLOOD PRESSURE: 80 MMHG | RESPIRATION RATE: 18 BRPM | BODY MASS INDEX: 32.12 KG/M2

## 2024-07-20 DIAGNOSIS — J20.9 ACUTE BRONCHITIS, UNSPECIFIED ORGANISM: Primary | ICD-10-CM

## 2024-07-20 PROCEDURE — 99213 OFFICE O/P EST LOW 20 MIN: CPT | Performed by: EMERGENCY MEDICINE

## 2024-07-20 RX ORDER — AZITHROMYCIN 250 MG/1
TABLET, FILM COATED ORAL
Qty: 6 TABLET | Refills: 0 | Status: SHIPPED | OUTPATIENT
Start: 2024-07-20 | End: 2024-07-24

## 2024-07-20 RX ORDER — BROMPHENIRAMINE MALEATE, PSEUDOEPHEDRINE HYDROCHLORIDE, AND DEXTROMETHORPHAN HYDROBROMIDE 2; 30; 10 MG/5ML; MG/5ML; MG/5ML
5 SYRUP ORAL 4 TIMES DAILY PRN
Qty: 120 ML | Refills: 0 | Status: SHIPPED | OUTPATIENT
Start: 2024-07-20

## 2024-07-20 RX ORDER — ALBUTEROL SULFATE 90 UG/1
2 AEROSOL, METERED RESPIRATORY (INHALATION) EVERY 6 HOURS PRN
Qty: 8.5 G | Refills: 0 | Status: SHIPPED | OUTPATIENT
Start: 2024-07-20

## 2024-07-20 RX ORDER — PREDNISONE 50 MG/1
50 TABLET ORAL DAILY
Qty: 1 TABLET | Refills: 0 | Status: SHIPPED | OUTPATIENT
Start: 2024-07-20 | End: 2024-07-21

## 2024-07-20 NOTE — PROGRESS NOTES
[facility]        NAME: Gwendolyn Jorge is a 39 y.o. female  : 1985    MRN: [id number]  DATE: 2024  TIME: 7:05 PM    Assessment and Plan   Acute bronchitis, unspecified organism [J20.9]  1. Acute bronchitis, unspecified organism  azithromycin (ZITHROMAX) 250 mg tablet    brompheniramine-pseudoephedrine-DM 30-2-10 MG/5ML syrup    albuterol (ProAir HFA) 90 mcg/act inhaler    predniSONE 50 mg tablet            Patient Instructions     Start antibiotic as prescribed  Start prednisone as prescribed  Start albuterol inhaler as prescribed  Start Bromfed as prescribed  Vitamin D3 2000 IU daily  Vitamin C 1000mg twice per day  Multivitamin daily  Fluids and rest  Over the counter cold medication as needed (EX: Coricidin HBP, tylenol/motrin)  Follow up with PCP in 3-5 days.  Proceed to ER if symptoms worsen.    Eat yogurt with live and active cultures and/or take a probiotic at least 3 hours before or after antibiotic dose. Monitor stool for diarrhea and/or blood. If this occurs, contact primary care doctor ASAP.n.    If tests are performed, our office will contact you with results only if changes need to made to the care plan discussed with you at the visit. You can review your full results on [facility].    Chief Complaint     Chief Complaint   Patient presents with    Cough     Patient reports cough X 2 weeks. Started with productive cough today. Did have some shortness of breath today.         History of Present Illness       Patient is a 38 yo female with no significant PMH presenting in the clinic today for cough x 2 weeks. Admits productive cough. Denies fever, chills, body aches, fatigue, headache, dizziness, sore throat, ear pain, congestion, chest pain, SOB, abdominal pain, n/v/d. Admits the use of Mucinex and Delsym for sx management. Denies recent sick contacts. Denies personal h/o cardiac conditions, respiratory conditions, smoking, DM, and renal conditions.        Review of  Systems   Review of Systems   Constitutional:  Negative for chills, fatigue and fever.   HENT:  Negative for congestion, ear pain, postnasal drip, rhinorrhea, sinus pressure, sinus pain and sore throat.    Respiratory:  Positive for cough. Negative for shortness of breath.    Cardiovascular:  Negative for chest pain.   Gastrointestinal:  Negative for abdominal pain, diarrhea, nausea and vomiting.   Musculoskeletal:  Negative for myalgias.   Skin:  Negative for rash.   Neurological:  Negative for headaches.         Current Medications       Current Outpatient Medications:     albuterol (ProAir HFA) 90 mcg/act inhaler, Inhale 2 puffs every 6 (six) hours as needed for wheezing (cough), Disp: 8.5 g, Rfl: 0    Aspirin-Acetaminophen-Caffeine (EXCEDRIN MIGRAINE PO), Take by mouth, Disp: , Rfl:     azithromycin (ZITHROMAX) 250 mg tablet, Take 2 tablets today then 1 tablet daily x 4 days, Disp: 6 tablet, Rfl: 0    brompheniramine-pseudoephedrine-DM 30-2-10 MG/5ML syrup, Take 5 mL by mouth 4 (four) times a day as needed for allergies, cough or congestion, Disp: 120 mL, Rfl: 0    levothyroxine 112 mcg tablet, Take 1 tablet (112 mcg total) by mouth daily, Disp: 90 tablet, Rfl: 1    predniSONE 50 mg tablet, Take 1 tablet (50 mg total) by mouth daily for 1 day, Disp: 1 tablet, Rfl: 0    Current Allergies     Allergies as of 2024    (No Known Allergies)            The following portions of the patient's history were reviewed and updated as appropriate: allergies, current medications, past family history, past medical history, past social history, past surgical history and problem list.     Past Medical History:   Diagnosis Date    Abnormal Pap smear of cervix     Hypothyroid     Migraine     Placental abruption 2020    Preeclampsia 2018       Past Surgical History:   Procedure Laterality Date     SECTION       SECTION      TONSILLECTOMY  2007       Family History   Problem Relation Age of  Onset    Hypertension Mother     Hyperlipidemia Mother     Asthma Mother     Migraines Mother     Thyroid disease Mother     Diabetes type II Maternal Grandfather     Hypertension Maternal Grandfather     Hyperlipidemia Maternal Grandfather     Diabetes Maternal Grandfather     Heart attack Maternal Grandfather     Heart disease Maternal Grandfather     Heart failure Maternal Grandfather     Hearing loss Maternal Grandfather     Hypertension Maternal Grandmother     Hyperlipidemia Maternal Grandmother     Heart disease Maternal Grandmother     Migraines Maternal Grandmother     Vision loss Maternal Grandmother     Glaucoma Maternal Grandmother     Hypertension Paternal Grandmother          Medications have been verified.        Objective   /80   Pulse 91   Temp 97.6 °F (36.4 °C)   Resp 18   Wt 90.3 kg (199 lb)   SpO2 98%   BMI 32.12 kg/m²        Physical Exam     Physical Exam  Vitals reviewed.   Constitutional:       General: She is not in acute distress.     Appearance: Normal appearance. She is normal weight. She is not ill-appearing.   HENT:      Head: Normocephalic.      Right Ear: Hearing, tympanic membrane, ear canal and external ear normal. No middle ear effusion. There is no impacted cerumen. Tympanic membrane is not erythematous or bulging.      Left Ear: Hearing, tympanic membrane, ear canal and external ear normal.  No middle ear effusion. There is no impacted cerumen. Tympanic membrane is not erythematous or bulging.      Nose: Nose normal. No congestion or rhinorrhea.      Right Sinus: No maxillary sinus tenderness or frontal sinus tenderness.      Left Sinus: No maxillary sinus tenderness or frontal sinus tenderness.      Mouth/Throat:      Lips: Pink.      Mouth: Mucous membranes are moist.      Pharynx: Oropharynx is clear. Uvula midline. No pharyngeal swelling, oropharyngeal exudate, posterior oropharyngeal erythema or uvula swelling.   Eyes:      General:         Right eye: No  discharge.         Left eye: No discharge.      Conjunctiva/sclera: Conjunctivae normal.   Cardiovascular:      Rate and Rhythm: Normal rate and regular rhythm.      Pulses: Normal pulses.      Heart sounds: Normal heart sounds. No murmur heard.     No friction rub. No gallop.   Pulmonary:      Effort: Pulmonary effort is normal. No respiratory distress.      Breath sounds: Normal breath sounds. No stridor. No wheezing, rhonchi or rales.   Musculoskeletal:      Cervical back: Normal range of motion and neck supple. No tenderness.   Lymphadenopathy:      Cervical: No cervical adenopathy.   Skin:     General: Skin is warm.      Findings: No rash.   Neurological:      Mental Status: She is alert.   Psychiatric:         Mood and Affect: Mood normal.         Behavior: Behavior normal.

## 2024-07-20 NOTE — PATIENT INSTRUCTIONS
Start antibiotic as prescribed  Start prednisone as prescribed  Start albuterol inhaler as prescribed  Start Bromfed as prescribed  Vitamin D3 2000 IU daily  Vitamin C 1000mg twice per day  Multivitamin daily  Fluids and rest  Over the counter cold medication as needed (EX: Coricidin HBP, tylenol/motrin)  Follow up with PCP in 3-5 days.  Proceed to ER if symptoms worsen.    Eat yogurt with live and active cultures and/or take a probiotic at least 3 hours before or after antibiotic dose. Monitor stool for diarrhea and/or blood. If this occurs, contact primary care doctor ASAP.

## 2024-07-24 ENCOUNTER — AMB VIDEO VISIT (OUTPATIENT)
Dept: OTHER | Facility: HOSPITAL | Age: 39
End: 2024-07-24
Payer: COMMERCIAL

## 2024-07-24 DIAGNOSIS — J40 BRONCHITIS: Primary | ICD-10-CM

## 2024-07-24 PROCEDURE — 99212 OFFICE O/P EST SF 10 MIN: CPT | Performed by: PHYSICIAN ASSISTANT

## 2024-07-24 RX ORDER — BENZONATATE 200 MG/1
200 CAPSULE ORAL 3 TIMES DAILY PRN
Qty: 20 CAPSULE | Refills: 0 | Status: SHIPPED | OUTPATIENT
Start: 2024-07-24

## 2024-07-24 RX ORDER — METHYLPREDNISOLONE 4 MG/1
TABLET ORAL
Qty: 21 EACH | Refills: 0 | Status: SHIPPED | OUTPATIENT
Start: 2024-07-24

## 2024-07-24 NOTE — PATIENT INSTRUCTIONS
"Schedule a follow-up appointment with your primary care physician for recheck in 2-3 days-especially if symptoms aren't improving. If you cannot see your PCP, you can schedule a follow up appointment at a [facility]. Go to the emergency department if you develop any new or worsening symptoms including fevers, more mucous production, or anything else that is concerning.    Excuses can be found in \"Letters\" section of [email or web address]. Can print if opened from a web browser  [facility] phone number is [phone] if you need assistance or have further questions    [phone]  Schedule or Reschedule Outpatient Testing - Option 2  Billing - Option 3  General Info - Option 4  Zebtabt Help - Option 5  Comprehensive Spine Program - Option 6   COVID - Option 7  Patient Education     Acute bronchitis in adults   The Basics   Written by the doctors and editors at [facility]   What is bronchitis? -- This is an infection that causes a cough. It happens when the tubes that carry air into the lungs, called the \"bronchi,\" get infected (figure 1).  Usually, bronchitis happens after a person gets a cold or the flu. The viruses that cause the cold or flu infect the bronchi and irritate them. Antibiotics do not help bronchitis.  Bronchitis can also happen when a person gets an infection called \"whooping cough,\" but this is much less common. Whooping cough is caused by bacteria that can infect the bronchi. Most people get vaccines to prevent whooping cough, but the vaccine doesn't always work. Your doctor will be able to tell if you have whooping cough by doing an exam and listening to your cough.  This article is about \"acute\" bronchitis. This is different from \"chronic\" bronchitis, which is a lung disease that most often affects people who smoke.  What are the symptoms of bronchitis? -- The most common symptoms are:   A cough that can last up to a few weeks   Coughing up mucus that is clear, yellow, or green - " Green mucus does not always mean that you have a bacterial infection.  You might also have other cold or flu symptoms, like a stuffy nose, sore throat, or headache. People with bronchitis do not usually get a fever.  Will I need tests? -- Most people with bronchitis do not need a test. But if your doctor or nurse is not sure what is causing your cough, they might do tests. For example, they might order a chest X-ray. Or if they think that you might have COVID-19, they will test you for the virus that causes the infection.  How is bronchitis treated? -- Bronchitis almost always goes away on its own. But the cough can take up to 3 weeks to get better, and sometimes even longer.  Doctors do not usually treat bronchitis with antibiotic medicines. That's because bronchitis is usually caused by a virus, and antibiotics kill bacteria, not viruses. Also, antibiotics can actually cause other problems.  To feel better, you can treat your cold and flu symptoms. You can:   Get lots of rest, and drink plenty of liquids.   Drink hot tea.   Suck on cough drops or hard candy.   Take over-the-counter cough and cold medicines.   Breath in warm, moist air, such as in the shower, over a kettle, or from a humidifier.   Take a pain-relieving medicine if you have cold or flu symptoms like headache, muscle aches, or joint pain.  Avoid smoking or being around others who smoke. This can make your cough worse.  How can I keep from getting bronchitis again? -- You can reduce your chance of getting bronchitis again by keeping the germs that cause bronchitis out of your body. One of the best ways to do this is to wash your hands often with soap and water. If there is no sink nearby, you can use a hand gel with alcohol in it to clean your hands.  How can I keep from spreading germs? -- In addition to washing your hands often, cover your mouth with your elbow when you sneeze or cough. Using your elbow keeps you from getting germs on your hands. If  "you use a tissue, throw the tissue away and wash your hands.  When should I call the doctor? -- Call for advice if you have:   A fever higher than 100.4°F (38°C), or chills   Chest pain when you cough, trouble breathing, or coughing up blood   A barking cough that makes it hard to talk   Cough and weight loss that you cannot explain   Symptoms that are not getting better after 3 weeks  All topics are updated as new evidence becomes available and our peer review process is complete.  This topic retrieved from [employer] on: May 16, 2024.  Topic 51993 Version 17.0  Release: 32.4.3 - C32.135  © 2024 UpToDate, Inc. and/or its affiliates. All rights reserved.  figure 1: Normal lungs     The lungs sit in the chest, inside the ribcage. They are covered with a thin membrane called the \"pleura.\" The windpipe, or trachea, branches into 2 smaller airways called the left and right \"bronchi.\" The space between the lungs is called the \"mediastinum.\" Lymph nodes are located within and around the lungs and mediastinum.  Graphic 82150 Version 14.0  Consumer Information Use and Disclaimer   Disclaimer: This generalized information is a limited summary of diagnosis, treatment, and/or medication information. It is not meant to be comprehensive and should be used as a tool to help the user understand and/or assess potential diagnostic and treatment options. It does NOT include all information about conditions, treatments, medications, side effects, or risks that may apply to a specific patient. It is not intended to be medical advice or a substitute for the medical advice, diagnosis, or treatment of a health care provider based on the health care provider's examination and assessment of a patient's specific and unique circumstances. Patients must speak with a health care provider for complete information about their health, medical questions, and treatment options, including any risks or benefits regarding use of medications. This " information does not endorse any treatments or medications as safe, effective, or approved for treating a specific patient. UpToDate, Inc. and its affiliates disclaim any warranty or liability relating to this information or the use thereof.The use of this information is governed by the Terms of Use, available at https://www.[employer].com/en/know/clinical-effectiveness-terms. 2024© UpToDate, Inc. and its affiliates and/or licensors. All rights reserved.  Copyright   © 2024 UpToDate, Inc. and/or its affiliates. All rights reserved.

## 2024-07-24 NOTE — CARE ANYWHERE EVISITS
Visit Summary for ABDULKADIR WALDEN - Gender: Female - Date of Birth: 1985  Date: [id number] - Duration: 4 minutes  Patient: ABDULKADIR WALDEN  Provider: Shannon Severino PA-C    Patient Contact Information  Address  [address]; [place] [address]  [phone]    Visit Topics  Cold [Added By: Self - 2024-07-24]    Triage Questions   What is your current physical address in the event of a medical emergency? Answer []  Are you allergic to any medications? Answer []  Are you now or could you be pregnant? Answer []  Do you have any immune system compromise or chronic lung   disease? Answer []  Do you have any vulnerable family members in the home (infant, pregnant, cancer, elderly)? Answer []     Conversation Transcripts  [0A][0A] [Notification] You are connected with Shannon Severino PA-C, Urgent Care Specialist.[0A][Notification] ABDULKADIR WALDEN is located in [place].[0A][Notification] ABDULKADIR WALDEN has shared health history...[0A]    Diagnosis  Acute bronchitis    Procedures  Value: [id number] Code: CPT-4 UNLISTED E&M SERVICE    Medications Prescribed    No prescriptions ordered    Electronically signed by: Severino PA-C, Shannon(NPI [id number])  
167.64

## 2024-07-24 NOTE — PROGRESS NOTES
Required Documentation:  Encounter provider: Shannon D Severino, PA-C    Identify all parties in room with patient during virtual visit:  child(juan carlos)- permission granted    The patient was identified by name and date of birth. Gwendolyn Jorge was informed that this is a telemedicine visit and that the visit is being conducted through the [employer] platform. She agrees to proceed..  My office door was closed. No one else was in the room.  She acknowledged consent and understanding of privacy and security of the video platform. The patient has agreed to participate and understands they can discontinue the visit at any time.    Verification of patient location:  Patient is located at Home in the following state in which I hold an active license [place]    Patient is aware this is a billable service.     Reason for visit is No chief complaint on file.       Subjective  HPI   Pt reports was seen at  on  was rx'd zpak, inhaler, bromphen cough syrup, steroid (but only given 1 day). Continues to have cough despite taking everything. No fevers or SOB.    Past Medical History:   Diagnosis Date    Abnormal Pap smear of cervix     Hypothyroid     Migraine     Placental abruption 2020    Preeclampsia 2018       Past Surgical History:   Procedure Laterality Date     SECTION       SECTION      TONSILLECTOMY  2007        No Known Allergies    Review of Systems   Constitutional:  Negative for fever.   HENT:  Negative for nosebleeds.    Eyes:  Negative for redness.   Respiratory:  Positive for cough. Negative for shortness of breath.    Cardiovascular:  Negative for chest pain.   Gastrointestinal:  Negative for blood in stool.   Genitourinary:  Negative for hematuria.   Musculoskeletal:  Negative for gait problem.   Skin:  Negative for rash.   Neurological:  Negative for seizures.   Psychiatric/Behavioral:  Negative for behavioral problems.        Video Exam    There were no vitals  "filed for this visit.    Physical Exam  Constitutional:       General: She is not in acute distress.     Appearance: Normal appearance. She is not ill-appearing or toxic-appearing.   HENT:      Head: Normocephalic and atraumatic.      Nose: No rhinorrhea.      Mouth/Throat:      Mouth: Mucous membranes are moist.   Eyes:      Conjunctiva/sclera: Conjunctivae normal.   Pulmonary:      Effort: Pulmonary effort is normal. No respiratory distress.      Breath sounds: No wheezing (no gross audible wheeze through computer).      Comments: Frequent dry wheezy cough  Musculoskeletal:      Cervical back: Normal range of motion.   Skin:     Findings: No rash (on face or neck).   Neurological:      Mental Status: She is alert.      Cranial Nerves: No dysarthria or facial asymmetry.   Psychiatric:         Mood and Affect: Mood normal.         Behavior: Behavior normal.         Visit Time  Total Visit Duration: 5 minutes    Assessment/Plan:    Diagnoses and all orders for this visit:    Bronchitis  -     methylPREDNISolone 4 MG tablet therapy pack; Use as directed on package  -     benzonatate (TESSALON) 200 MG capsule; Take 1 capsule (200 mg total) by mouth 3 (three) times a day as needed for cough        Patient Instructions   Schedule a follow-up appointment with your primary care physician for recheck in 2-3 days-especially if symptoms aren't improving. If you cannot see your PCP, you can schedule a follow up appointment at a [facility]. Go to the emergency department if you develop any new or worsening symptoms including fevers, more mucous production, or anything else that is concerning.    Excuses can be found in \"Letters\" section of [employer]. Can print if opened from a web browser  Care Anywhere phone number is [phone] if you need assistance or have further questions    [phone] [facility] ([phone])  Schedule or Reschedule Outpatient Testing - Option 2  Billing - Option 3  General Info - Option 4  [facility] " Help - Option 5  Comprehensive Spine Program - Option 6   COVID - Option 7

## 2024-08-04 ENCOUNTER — OFFICE VISIT (OUTPATIENT)
Dept: URGENT CARE | Age: 39
End: 2024-08-04
Payer: COMMERCIAL

## 2024-08-04 VITALS
HEART RATE: 95 BPM | DIASTOLIC BLOOD PRESSURE: 93 MMHG | TEMPERATURE: 97.7 F | BODY MASS INDEX: 30.92 KG/M2 | WEIGHT: 197 LBS | RESPIRATION RATE: 20 BRPM | SYSTOLIC BLOOD PRESSURE: 137 MMHG | HEIGHT: 67 IN | OXYGEN SATURATION: 96 %

## 2024-08-04 DIAGNOSIS — J20.9 ACUTE BRONCHITIS, UNSPECIFIED ORGANISM: Primary | ICD-10-CM

## 2024-08-04 PROCEDURE — 99214 OFFICE O/P EST MOD 30 MIN: CPT | Performed by: EMERGENCY MEDICINE

## 2024-08-04 RX ORDER — DOXYCYCLINE 100 MG/1
100 TABLET ORAL 2 TIMES DAILY
Qty: 14 TABLET | Refills: 0 | Status: SHIPPED | OUTPATIENT
Start: 2024-08-04 | End: 2024-08-11

## 2024-08-04 NOTE — PROGRESS NOTES
[facility]        NAME: Gwendolyn Jorge is a 39 y.o. female  : 1985    MRN: [id number]  DATE: 2024  TIME: 11:48 AM      Assessment and Plan     Acute bronchitis, unspecified organism [J20.9]  1. Acute bronchitis, unspecified organism  doxycycline (ADOXA) 100 MG tablet          Will switch to doxycyline. Will hold on additional oral steroids at this time as patient received no relief from recent oral steroids. Will hold on chest xray at this time- patient agreeable. Normal lung sounds, no fever.   Patient Instructions     Take antibiotic as prescribed. Recommend probiotic use while taking antibiotic.   Avoid direct sunlight with use of doxycyline, reapply SPF 50 at least every 1-2 hours, wear long sleeves, and a wide brimmed hat.  Continue honey for cough.  Take antibiotic as prescribed. Recommend probiotic use while taking antibiotic. Acetaminophen or ibuprofen for fever and pain. Follow-up with PCP in 3-5 days. Go to ER if symptoms worsen.     Chief Complaint     Chief Complaint   Patient presents with    Cough     ONSET 7/10/24. STARTED COUGHING WAS TREATED ON  WAS DIAGNOSED WITH BRONCHITIS, WAS PRESCRIBED MEDS AND FINISHED THEM, BUT PATIENT IS STILL HAVING A PRODUCTIVE COUGH. LIME GREEN COLORED SPUTUM.         History of Present Illness     Patient has a-year-old female who presents with moist productive cough.  Reports discolored mucus production.  Patient was previously prescribed steroid and Z-Shen for cough.  States minimal relief from bronchitis with Tessalon capsules.  Reports she is starting to get chills with worsening cough.  Denies chance of pregnancy.  Denies asthma history.  Denies wheezing reports shortness of breath secondary to Catching her breath and coughing.  Denies known sick contacts. States no relief from oral steroids or mucinex OTC.     Cough  Associated symptoms include chills and shortness of breath. Pertinent negatives include no wheezing.       Review of  Systems     Review of Systems   Constitutional:  Positive for chills.   Respiratory:  Positive for cough and shortness of breath. Negative for wheezing.    All other systems reviewed and are negative.        Current Medications       Current Outpatient Medications:     albuterol (ProAir HFA) 90 mcg/act inhaler, Inhale 2 puffs every 6 (six) hours as needed for wheezing (cough), Disp: 8.5 g, Rfl: 0    Aspirin-Acetaminophen-Caffeine (EXCEDRIN MIGRAINE PO), Take by mouth, Disp: , Rfl:     doxycycline (ADOXA) 100 MG tablet, Take 1 tablet (100 mg total) by mouth 2 (two) times a day for 7 days, Disp: 14 tablet, Rfl: 0    levothyroxine 112 mcg tablet, Take 1 tablet (112 mcg total) by mouth daily, Disp: 90 tablet, Rfl: 1    benzonatate (TESSALON) 200 MG capsule, Take 1 capsule (200 mg total) by mouth 3 (three) times a day as needed for cough (Patient not taking: Reported on 2024), Disp: 20 capsule, Rfl: 0    brompheniramine-pseudoephedrine-DM 30-2-10 MG/5ML syrup, Take 5 mL by mouth 4 (four) times a day as needed for allergies, cough or congestion (Patient not taking: Reported on 2024), Disp: 120 mL, Rfl: 0    methylPREDNISolone 4 MG tablet therapy pack, Use as directed on package (Patient not taking: Reported on 2024), Disp: 21 each, Rfl: 0    Current Allergies     Allergies as of 2024    (No Known Allergies)              The following portions of the patient's history were reviewed and updated as appropriate: allergies, current medications, past family history, past medical history, past social history, past surgical history and problem list.     Past Medical History:   Diagnosis Date    Abnormal Pap smear of cervix     Hypothyroid     Migraine     Placental abruption 2020    Preeclampsia 2018       Past Surgical History:   Procedure Laterality Date     SECTION       SECTION      TONSILLECTOMY  2007       Family History   Problem Relation Age of Onset    Hypertension  "Mother     Hyperlipidemia Mother     Asthma Mother     Migraines Mother     Thyroid disease Mother     Diabetes type II Maternal Grandfather     Hypertension Maternal Grandfather     Hyperlipidemia Maternal Grandfather     Diabetes Maternal Grandfather     Heart attack Maternal Grandfather     Heart disease Maternal Grandfather     Heart failure Maternal Grandfather     Hearing loss Maternal Grandfather     Hypertension Maternal Grandmother     Hyperlipidemia Maternal Grandmother     Heart disease Maternal Grandmother     Migraines Maternal Grandmother     Vision loss Maternal Grandmother     Glaucoma Maternal Grandmother     Hypertension Paternal Grandmother          Medications have been verified.        Objective     /93 (BP Location: Right arm, Patient Position: Sitting)   Pulse 95   Temp 97.7 °F (36.5 °C) (Temporal)   Resp 20   Ht 5' 7\" (1.702 m)   Wt 89.4 kg (197 lb)   SpO2 96%   BMI 30.85 kg/m²   No LMP recorded.         Physical Exam     Physical Exam  Vitals and nursing note reviewed.   Constitutional:       General: She is awake. She is not in acute distress.     Appearance: Normal appearance. She is not ill-appearing, toxic-appearing or diaphoretic.   HENT:      Right Ear: Tympanic membrane, ear canal and external ear normal.      Left Ear: Tympanic membrane, ear canal and external ear normal.      Nose: Congestion present.      Mouth/Throat:      Lips: Pink.      Mouth: Mucous membranes are moist.      Pharynx: Oropharynx is clear. Uvula midline.   Cardiovascular:      Rate and Rhythm: Normal rate.      Pulses: Normal pulses.      Heart sounds: Normal heart sounds, S1 normal and S2 normal.   Pulmonary:      Effort: Pulmonary effort is normal.      Breath sounds: Normal breath sounds and air entry.      Comments: Moist productive cough noted throughout examination   Skin:     General: Skin is warm.      Capillary Refill: Capillary refill takes less than 2 seconds.   Neurological:      Mental " Status: She is alert.   Psychiatric:         Mood and Affect: Mood normal.         Behavior: Behavior normal.         Thought Content: Thought content normal.         Judgment: Judgment normal.

## 2024-08-04 NOTE — PATIENT INSTRUCTIONS
Take antibiotic as prescribed. Recommend probiotic use while taking antibiotic.   Avoid direct sunlight with use of doxycyline, reapply SPF 50 at least every 1-2 hours, wear long sleeves, and a wide brimmed hat.  Continue honey for cough.  Take antibiotic as prescribed. Recommend probiotic use while taking antibiotic. Acetaminophen or ibuprofen for fever and pain. Follow-up with PCP in 3-5 days. Go to ER if symptoms worsen.

## 2024-08-09 DIAGNOSIS — E03.8 HYPOTHYROIDISM DUE TO HASHIMOTO'S THYROIDITIS: ICD-10-CM

## 2024-08-09 DIAGNOSIS — E06.3 HYPOTHYROIDISM DUE TO HASHIMOTO'S THYROIDITIS: ICD-10-CM

## 2024-08-09 RX ORDER — LEVOTHYROXINE SODIUM 112 UG/1
112 TABLET ORAL DAILY
Qty: 90 TABLET | Refills: 1 | Status: SHIPPED | OUTPATIENT
Start: 2024-08-09

## 2024-09-17 ENCOUNTER — HOSPITAL ENCOUNTER (OUTPATIENT)
Dept: RADIOLOGY | Facility: IMAGING CENTER | Age: 39
Discharge: HOME | End: 2024-09-17
Payer: COMMERCIAL

## 2024-09-17 ENCOUNTER — ANNUAL EXAM (OUTPATIENT)
Dept: OBGYN CLINIC | Facility: CLINIC | Age: 39
End: 2024-09-17
Payer: COMMERCIAL

## 2024-09-17 VITALS
WEIGHT: 199.6 LBS | HEIGHT: 67 IN | SYSTOLIC BLOOD PRESSURE: 122 MMHG | BODY MASS INDEX: 31.33 KG/M2 | DIASTOLIC BLOOD PRESSURE: 80 MMHG

## 2024-09-17 VITALS — HEIGHT: 67 IN | BODY MASS INDEX: 30.92 KG/M2 | WEIGHT: 197 LBS

## 2024-09-17 DIAGNOSIS — Z01.419 ENCOUNTER FOR WELL WOMAN EXAM WITH ROUTINE GYNECOLOGICAL EXAM: Primary | ICD-10-CM

## 2024-09-17 DIAGNOSIS — Z12.31 ENCOUNTER FOR SCREENING MAMMOGRAM FOR BREAST CANCER: ICD-10-CM

## 2024-09-17 DIAGNOSIS — Z12.4 PAP SMEAR FOR CERVICAL CANCER SCREENING: ICD-10-CM

## 2024-09-17 PROCEDURE — S0612 ANNUAL GYNECOLOGICAL EXAMINA: HCPCS | Performed by: OBSTETRICS & GYNECOLOGY

## 2024-09-17 PROCEDURE — 77067 SCR MAMMO BI INCL CAD: CPT

## 2024-09-17 PROCEDURE — 77063 BREAST TOMOSYNTHESIS BI: CPT

## 2024-09-17 PROCEDURE — G0476 HPV COMBO ASSAY CA SCREEN: HCPCS | Performed by: OBSTETRICS & GYNECOLOGY

## 2024-09-17 PROCEDURE — G0145 SCR C/V CYTO,THINLAYER,RESCR: HCPCS | Performed by: OBSTETRICS & GYNECOLOGY

## 2024-09-17 NOTE — PROGRESS NOTES
"[facility] [address], [place], [place]    ASSESSMENT/PLAN: Gwendolyn Jorge is a 39 y.o.  who presents for annual gynecologic exam.    Encounter for routine gynecologic examination  - Routine well woman exam completed today.  - Cervical Cancer Screening: Current ASCCP Guidelines reviewed. Last Pap: 2022. Discussed every 2 years. Pap done today  - HPV Vaccination status: Immunization series complete  - Contraceptive counseling discussed.  Current contraception: none     Additional problems addressed during this visit:  1. Encounter for well woman exam with routine gynecological exam  2. Pap smear for cervical cancer screening  -     Liquid-based pap, screening      CC:  Annual Gynecologic Examination    HPI: Gwendolyn Jorge is a 39 y.o.  who presents for annual gynecologic examination.  Gynecologic Exam      She reports no new changes to her health.  She reports no breast concerns. She gets regular periods. She has no vaginal discharge, vulvar or vaginal lesions, pelvic pain, or abnormal bleeding.  She has no sexual health concerns and is currently sexually active with one male partner.  She contracepts with nothing.     The following portions of the patient's history were reviewed and updated as appropriate: allergies, current medications, past family history, past medical history, obstetric history, gynecologic history, past social history, past surgical history and problem list.    Review of Systems   Constitutional: Negative.    HENT: Negative.     Eyes: Negative.    Respiratory: Negative.     Cardiovascular: Negative.    Gastrointestinal: Negative.    Genitourinary: Negative.    Musculoskeletal: Negative.    All other systems reviewed and are negative.        Objective:  /80   Ht 5' 7\" (1.702 m)   Wt 90.5 kg (199 lb 9.6 oz)   LMP 09/15/2024 (Exact Date)   BMI 31.26 kg/m²    Physical Exam  Vitals reviewed.   Constitutional:       General: She is " not in acute distress.     Appearance: She is well-developed.   HENT:      Head: Normocephalic and atraumatic.      Nose: Nose normal.   Cardiovascular:      Rate and Rhythm: Normal rate.   Pulmonary:      Effort: Pulmonary effort is normal. No respiratory distress.   Chest:   Breasts:     Breasts are symmetrical.      Right: Normal. No mass, nipple discharge, skin change or tenderness.      Left: Normal. No mass, nipple discharge, skin change or tenderness.   Abdominal:      General: There is no distension.      Palpations: Abdomen is soft. There is no mass.      Tenderness: There is no abdominal tenderness. There is no guarding or rebound.   Genitourinary:     General: Normal vulva.      Exam position: Lithotomy position.      Labia:         Right: No lesion.         Left: No lesion.       Urethra: No prolapse (urethral meatus normal).      Vagina: Normal. No vaginal discharge, erythema or bleeding.      Cervix: Normal.      Uterus: Normal.       Adnexa: Right adnexa normal and left adnexa normal.   Musculoskeletal:         General: Normal range of motion.      Cervical back: Normal range of motion.   Lymphadenopathy:      Upper Body:      Right upper body: No supraclavicular, axillary or pectoral adenopathy.      Left upper body: No supraclavicular, axillary or pectoral adenopathy.      Lower Body: No right inguinal adenopathy. No left inguinal adenopathy.   Skin:     General: Skin is warm and dry.   Neurological:      Mental Status: She is alert and oriented to person, place, and time.   Psychiatric:         Behavior: Behavior normal.         Thought Content: Thought content normal.         Judgment: Judgment normal.

## 2024-09-18 LAB
HPV HR 12 DNA CVX QL NAA+PROBE: NEGATIVE
HPV16 DNA CVX QL NAA+PROBE: NEGATIVE
HPV18 DNA CVX QL NAA+PROBE: NEGATIVE

## 2024-09-23 LAB
LAB AP GYN PRIMARY INTERPRETATION: NORMAL
Lab: NORMAL

## 2024-11-18 DIAGNOSIS — E03.4 HYPOTHYROIDISM DUE TO ACQUIRED ATROPHY OF THYROID: Primary | Chronic | ICD-10-CM

## 2024-11-18 DIAGNOSIS — E55.9 VITAMIN D DEFICIENCY: ICD-10-CM

## 2024-11-19 ENCOUNTER — APPOINTMENT (OUTPATIENT)
Dept: LAB | Facility: HOSPITAL | Age: 39
End: 2024-11-19
Attending: STUDENT IN AN ORGANIZED HEALTH CARE EDUCATION/TRAINING PROGRAM
Payer: COMMERCIAL

## 2024-11-19 LAB
T4 FREE SERPL-MCNC: 0.7 NG/DL (ref 0.61–1.12)
TSH SERPL DL<=0.05 MIU/L-ACNC: 6.38 UIU/ML (ref 0.45–4.5)

## 2024-11-19 PROCEDURE — 84439 ASSAY OF FREE THYROXINE: CPT | Performed by: STUDENT IN AN ORGANIZED HEALTH CARE EDUCATION/TRAINING PROGRAM

## 2024-11-19 PROCEDURE — 36415 COLL VENOUS BLD VENIPUNCTURE: CPT | Performed by: STUDENT IN AN ORGANIZED HEALTH CARE EDUCATION/TRAINING PROGRAM

## 2024-11-19 PROCEDURE — 84443 ASSAY THYROID STIM HORMONE: CPT | Performed by: STUDENT IN AN ORGANIZED HEALTH CARE EDUCATION/TRAINING PROGRAM

## 2024-11-20 ENCOUNTER — RESULTS FOLLOW-UP (OUTPATIENT)
Dept: ENDOCRINOLOGY | Facility: CLINIC | Age: 39
End: 2024-11-20

## 2024-11-21 ENCOUNTER — OFFICE VISIT (OUTPATIENT)
Dept: PODIATRY | Facility: CLINIC | Age: 39
End: 2024-11-21
Payer: COMMERCIAL

## 2024-11-21 ENCOUNTER — HOSPITAL ENCOUNTER (OUTPATIENT)
Dept: MAMMOGRAPHY | Facility: CLINIC | Age: 39
Discharge: HOME | End: 2024-11-21
Payer: COMMERCIAL

## 2024-11-21 ENCOUNTER — OFFICE VISIT (OUTPATIENT)
Dept: ENDOCRINOLOGY | Facility: CLINIC | Age: 39
End: 2024-11-21
Payer: COMMERCIAL

## 2024-11-21 ENCOUNTER — HOSPITAL ENCOUNTER (OUTPATIENT)
Dept: ULTRASOUND IMAGING | Facility: CLINIC | Age: 39
Discharge: HOME | End: 2024-11-21
Payer: COMMERCIAL

## 2024-11-21 ENCOUNTER — APPOINTMENT (OUTPATIENT)
Dept: RADIOLOGY | Age: 39
End: 2024-11-21
Payer: COMMERCIAL

## 2024-11-21 VITALS
DIASTOLIC BLOOD PRESSURE: 70 MMHG | HEIGHT: 67 IN | HEART RATE: 80 BPM | OXYGEN SATURATION: 98 % | SYSTOLIC BLOOD PRESSURE: 136 MMHG | TEMPERATURE: 98.1 F | WEIGHT: 200.4 LBS | BODY MASS INDEX: 31.45 KG/M2

## 2024-11-21 VITALS — BODY MASS INDEX: 31.45 KG/M2 | WEIGHT: 200.4 LBS | HEIGHT: 67 IN

## 2024-11-21 DIAGNOSIS — E03.4 HYPOTHYROIDISM DUE TO ACQUIRED ATROPHY OF THYROID: Primary | Chronic | ICD-10-CM

## 2024-11-21 DIAGNOSIS — M71.371 OTHER BURSAL CYST, RIGHT ANKLE AND FOOT: ICD-10-CM

## 2024-11-21 DIAGNOSIS — R92.8 ABNORMAL SCREENING MAMMOGRAM: ICD-10-CM

## 2024-11-21 DIAGNOSIS — E06.3 HYPOTHYROIDISM DUE TO HASHIMOTO'S THYROIDITIS: ICD-10-CM

## 2024-11-21 DIAGNOSIS — M25.871 SESAMOIDITIS OF RIGHT FOOT: ICD-10-CM

## 2024-11-21 DIAGNOSIS — M25.871 SESAMOIDITIS OF RIGHT FOOT: Primary | ICD-10-CM

## 2024-11-21 DIAGNOSIS — S92.811A CLOSED FRACTURE OF SESAMOID BONE OF RIGHT FOOT, INITIAL ENCOUNTER: ICD-10-CM

## 2024-11-21 PROCEDURE — 77066 DX MAMMO INCL CAD BI: CPT

## 2024-11-21 PROCEDURE — 76642 ULTRASOUND BREAST LIMITED: CPT

## 2024-11-21 PROCEDURE — G0279 TOMOSYNTHESIS, MAMMO: HCPCS

## 2024-11-21 PROCEDURE — 99204 OFFICE O/P NEW MOD 45 MIN: CPT | Performed by: PODIATRIST

## 2024-11-21 PROCEDURE — 99213 OFFICE O/P EST LOW 20 MIN: CPT | Performed by: STUDENT IN AN ORGANIZED HEALTH CARE EDUCATION/TRAINING PROGRAM

## 2024-11-21 PROCEDURE — 73630 X-RAY EXAM OF FOOT: CPT

## 2024-11-21 RX ORDER — LEVOTHYROXINE SODIUM 112 UG/1
TABLET ORAL
Qty: 96 TABLET | Refills: 1 | Status: SHIPPED | OUTPATIENT
Start: 2024-11-21

## 2024-11-21 RX ORDER — LEVOTHYROXINE SODIUM 112 UG/1
112 TABLET ORAL DAILY
Qty: 96 TABLET | Refills: 1 | Status: SHIPPED | OUTPATIENT
Start: 2024-11-21 | End: 2024-11-21 | Stop reason: SDUPTHER

## 2024-11-21 NOTE — ASSESSMENT & PLAN NOTE
Reduce activity  RICE protocol  CAM boot dispensed  RTC 6 weeks for imaging, possible MRI  Orders:    Cam Boot

## 2024-11-21 NOTE — ASSESSMENT & PLAN NOTE
Suspected tendon cyst in tarsal tunnel. Can image with MSK US of the right tarsal tunnel  Orders:    US MSK limited; Future    Cam Boot

## 2024-11-21 NOTE — PROGRESS NOTES
Name: Gwendolyn Jorge      : 1985      MRN: [id number]  Encounter Provider: Chip Aleman DPM  Encounter Date: 2024   Encounter department: [facility] PODIATRY [facility]  :  Assessment & Plan  Sesamoiditis of right foot  Suspected tibial sesamoid stress fracture, confirmed on XR today  Orders:    XR foot 3+ vw right; Future    Cam Boot    Other bursal cyst, right ankle and foot  Suspected tendon cyst in tarsal tunnel. Can image with MSK US of the right tarsal tunnel  Orders:    US MSK limited; Future    Cam Boot    Closed fracture of sesamoid bone of right foot, initial encounter  Reduce activity  RICE protocol  CAM boot dispensed  RTC 6 weeks for imaging, possible MRI  Orders:    Cam Boot        History of Present Illness     HPI  Gwendolyn Jorge is a 39 y.o. female who presents with a lingering foot injury. Early October she bent her great toe awkwardly and the joint swelled. She has been getting lingering pain in the bottom of the great toe joint but also the medial ankle.       Review of Systems  As stated in HPI, otherwise normal    Medical History Reviewed by provider this encounter:  Tobacco  Allergies  Meds  Problems  Med Hx  Surg Hx  Fam Hx           Objective   There were no vitals taken for this visit.     Physical Exam  Vitals reviewed.   Constitutional:       Appearance: She is obese. She is not ill-appearing.   Cardiovascular:      Rate and Rhythm: Normal rate.      Pulses: Normal pulses.   Pulmonary:      Effort: No respiratory distress.   Musculoskeletal:         General: Swelling, tenderness (there is a small cyst like mass in the medial tarsal tunnel right ankle which is TTP. NO tendon weakenss or strength.) and signs of injury (sharp pain tibial sesamoid right foot. No MTPJ instability. EHL, FHL normal MMT. NOrmal joint ROM) present.   Skin:     Capillary Refill: Capillary refill takes less than 2 seconds.      Findings: No bruising.   Neurological:      Mental  Status: She is alert.     XRay 3 views of the right foot personally read by Dr. Aleman in office today and discussed with patient:    There is a subtle oblique fracture through tibial sesamoid, more clear on oblique but also visualized on AP view. Nondisplaced.   No significant degenerative changes.  No lytic or blastic osseous lesion.  Soft tissues are unremarkable.

## 2024-11-21 NOTE — PROGRESS NOTES
Name: Gwendolyn Jorge      : 1985      MRN: [id number]  Encounter Provider: Wayne Calloway MD  Encounter Date: 2024   Encounter department: [facility] [place]  :  Assessment & Plan  Hypothyroidism due to acquired atrophy of thyroid         Hypothyroidism due to Hashimoto's thyroiditis    Orders:    levothyroxine 112 mcg tablet; Take 1 tablet (112 mcg total) by mouth daily    Currently on levothyroxine 112 mcg once daily, she is taking it regularly and properly, recent TFTs showed elevated TSH at 6.384 and normal free T4 of 0.70.  I increased levothyroxine to extra half on Sundays and will continue 1 tablet rest of the week.  Advised that levotyroxine should be taken on an empty stomach. Should avoid taking medications like iron, calcium, tums, Q2raevuipr, PPI at the same time that may decrease absorption of T4. Should separate administration by 4hrs.  Return back in 6 months for  TFT in 2 months.    History of Present Illness     HPI  Gwendolyn Jorge is a 39 y.o. female who presents for follow-up for hypothyroidism.  She is currently on levothyroxine 112 mcg daily.  Recent TFTs showed: TSH of 6.384 and free T4 of 0.70      She denies palpitations,tremor, excessive sweating, bowel movement changes, heat intolerance, cold intolerance or abnormality in menstruation cycles.    She reports weight gain.      History obtained from: patient    Review of Systems: Negative except as mentioned in HPI  Medical History Reviewed by provider this encounter:     .  Current Outpatient Medications on File Prior to Visit   Medication Sig Dispense Refill    Aspirin-Acetaminophen-Caffeine (EXCEDRIN MIGRAINE PO) Take by mouth      [DISCONTINUED] levothyroxine 112 mcg tablet Take 1 tablet (112 mcg total) by mouth daily 90 tablet 1     No current facility-administered medications on file prior to visit.         Objective   There were no vitals taken for this visit.     Physical Exam  Vitals  and nursing note reviewed.   Constitutional:       General: She is not in acute distress.     Appearance: She is well-developed.   HENT:      Head: Normocephalic and atraumatic.   Cardiovascular:      Rate and Rhythm: Normal rate and regular rhythm.   Pulmonary:      Effort: Pulmonary effort is normal. No respiratory distress.   Abdominal:      Palpations: Abdomen is soft.   Musculoskeletal:         General: No swelling.      Cervical back: Neck supple.   Skin:     General: Skin is warm and dry.   Neurological:      Mental Status: She is alert.             Component      Latest Ref Rng 11/19/2024   FREE T4      0.61 - 1.12 ng/dL 0.70    TSH 3RD GENERATON      0.450 - 4.500 uIU/mL 6.384 (H)       Legend:  (H) High

## 2024-11-21 NOTE — ASSESSMENT & PLAN NOTE
Suspected tibial sesamoid stress fracture, confirmed on XR today  Orders:    XR foot 3+ vw right; Future    Cam Boot

## 2024-11-22 ENCOUNTER — RESULTS FOLLOW-UP (OUTPATIENT)
Dept: FAMILY MEDICINE CLINIC | Facility: CLINIC | Age: 39
End: 2024-11-22

## 2024-11-22 ENCOUNTER — TELEPHONE (OUTPATIENT)
Dept: FAMILY MEDICINE CLINIC | Facility: CLINIC | Age: 39
End: 2024-11-22

## 2024-11-22 ENCOUNTER — HOSPITAL ENCOUNTER (OUTPATIENT)
Dept: ULTRASOUND IMAGING | Facility: HOSPITAL | Age: 39
End: 2024-11-22
Payer: COMMERCIAL

## 2024-11-22 DIAGNOSIS — M71.371 OTHER BURSAL CYST, RIGHT ANKLE AND FOOT: ICD-10-CM

## 2024-11-22 PROCEDURE — 76882 US LMTD JT/FCL EVL NVASC XTR: CPT

## 2024-11-22 NOTE — TELEPHONE ENCOUNTER
AS    11/22/24  3:07 PM  Result Note  LMOM for patient to call office to review results  Heather Forbes DO to [facility]       11/22/24  2:09 PM  Result Note  Please let pt know that her breast US shows no suspicious findings/benign result

## 2024-12-24 ENCOUNTER — OFFICE VISIT (OUTPATIENT)
Dept: PODIATRY | Facility: CLINIC | Age: 39
End: 2024-12-24
Payer: COMMERCIAL

## 2024-12-24 VITALS — BODY MASS INDEX: 31.39 KG/M2 | HEIGHT: 67 IN | WEIGHT: 200 LBS

## 2024-12-24 DIAGNOSIS — M71.371 OTHER BURSAL CYST, RIGHT ANKLE AND FOOT: ICD-10-CM

## 2024-12-24 DIAGNOSIS — S92.811A CLOSED FRACTURE OF SESAMOID BONE OF RIGHT FOOT, INITIAL ENCOUNTER: Primary | ICD-10-CM

## 2024-12-24 DIAGNOSIS — M79.671 PAIN IN RIGHT FOOT: ICD-10-CM

## 2024-12-24 PROCEDURE — 99213 OFFICE O/P EST LOW 20 MIN: CPT | Performed by: PODIATRIST

## 2024-12-24 NOTE — LETTER
December 24, 2024     Patient: Gwendolyn Jorge  YOB: 1985  Date of Visit: 12/24/2024      To Whom it May Concern:    Gwendolyn Jorge is under my professional care. Gwendolyn was seen in my office on 12/24/2024. Gwendolyn may return to work 12/26/2024 without restriction.    If you have any questions or concerns, please don't hesitate to call.         Sincerely,          Chip Aleman DPM        CC: No Recipients

## 2024-12-24 NOTE — ASSESSMENT & PLAN NOTE
MSK US reviewed, no cyst visualized. Can monitor for now. I suspect the small mass is a varicose vein.

## 2024-12-24 NOTE — PROGRESS NOTES
"Name: Gwendolyn Jorge      : 1985      MRN: [id number]  Encounter Provider: Chip Aleman DPM  Encounter Date: 2024   Encounter department: [facility] [place]  :  Assessment & Plan  Closed fracture of sesamoid bone of right foot, initial encounter  XR todays shows improved fracture line tibial sesamoid, much less noticeable. Clinically she has little to no pain in this area. Can slowly transition from boot to sneaker. For another month, walking is fine but refrain from running or jumping       Pain in right foot    Orders:  •  XR foot 3+ vw right; Future    Other bursal cyst, right ankle and foot  [facility] US reviewed, no cyst visualized. Can monitor for now. I suspect the small mass is a varicose vein.        XRay 3 views of the right foot personally read by Dr. Aleman in office today and discussed with patient:    There is no acute fracture or dislocation.  No significant degenerative changes.  No lytic or blastic osseous lesion.  Soft tissues are unremarkable.      History of Present Illness   HPI  Gwendolyn Jorge is a 39 y.o. female who presents for F/U    Initial HPI: Gwendolyn Jorge is a 39 y.o. female who presents with a lingering foot injury. Early October she bent her great toe awkwardly and the joint swelled. She has been getting lingering pain in the bottom of the great toe joint but also the medial ankle.     2024: PAtient has suspected tibial sesamoid fracture. She ahs been in CAM boot. The foot feels fine in the boot, still sore when out of it.       Review of Systems  As stated in HPI, otherwise normal    Medical History Reviewed by provider this encounter:  Tobacco  Allergies  Meds  Problems  Med Hx  Surg Hx  Fam Hx           Objective   Ht 5' 7\" (1.702 m)   Wt 90.7 kg (200 lb)   BMI 31.32 kg/m²      Physical Exam  Vitals reviewed.   Cardiovascular:      Rate and Rhythm: Normal rate.      Pulses: Normal pulses.   Pulmonary:      Effort: Pulmonary " effort is normal. No respiratory distress.   Musculoskeletal:         General: No tenderness (no sesamoid pain or instability) or deformity.   Skin:     General: Skin is warm.      Findings: No erythema or rash.   Neurological:      Mental Status: She is alert.      Sensory: No sensory deficit.

## 2024-12-24 NOTE — ASSESSMENT & PLAN NOTE
XR todays shows improved fracture line tibial sesamoid, much less noticeable. Clinically she has little to no pain in this area. Can slowly transition from boot to sneaker. For another month, walking is fine but refrain from running or jumping

## 2025-01-22 ENCOUNTER — APPOINTMENT (OUTPATIENT)
Dept: LAB | Facility: CLINIC | Age: 40
End: 2025-01-22
Payer: COMMERCIAL

## 2025-01-22 DIAGNOSIS — E55.9 VITAMIN D DEFICIENCY: ICD-10-CM

## 2025-01-22 DIAGNOSIS — E06.3 HYPOTHYROIDISM DUE TO HASHIMOTO'S THYROIDITIS: ICD-10-CM

## 2025-01-22 LAB
25(OH)D3 SERPL-MCNC: 22.2 NG/ML (ref 30–100)
T4 FREE SERPL-MCNC: 0.7 NG/DL (ref 0.61–1.12)
TSH SERPL DL<=0.05 MIU/L-ACNC: 2.12 UIU/ML (ref 0.45–4.5)

## 2025-01-22 PROCEDURE — 82306 VITAMIN D 25 HYDROXY: CPT

## 2025-01-22 PROCEDURE — 36415 COLL VENOUS BLD VENIPUNCTURE: CPT

## 2025-01-22 PROCEDURE — 84443 ASSAY THYROID STIM HORMONE: CPT

## 2025-01-22 PROCEDURE — 84439 ASSAY OF FREE THYROXINE: CPT

## 2025-02-14 ENCOUNTER — TELEPHONE (OUTPATIENT)
Dept: FAMILY MEDICINE CLINIC | Facility: CLINIC | Age: 40
End: 2025-02-14

## 2025-02-14 NOTE — TELEPHONE ENCOUNTER
Called and left vm for pt to r/s her appt due to  changing her start time on Monday to 10am. If pt calls back please r/s next avail.

## 2025-02-25 DIAGNOSIS — E06.3 HYPOTHYROIDISM DUE TO HASHIMOTO'S THYROIDITIS: ICD-10-CM

## 2025-02-25 RX ORDER — LEVOTHYROXINE SODIUM 112 UG/1
TABLET ORAL
Qty: 96 TABLET | Refills: 1 | Status: SHIPPED | OUTPATIENT
Start: 2025-02-25